# Patient Record
Sex: MALE | Race: WHITE | NOT HISPANIC OR LATINO | Employment: OTHER | URBAN - METROPOLITAN AREA
[De-identification: names, ages, dates, MRNs, and addresses within clinical notes are randomized per-mention and may not be internally consistent; named-entity substitution may affect disease eponyms.]

---

## 2017-09-20 ENCOUNTER — OFFICE VISIT (OUTPATIENT)
Dept: FAMILY MEDICINE | Facility: CLINIC | Age: 77
End: 2017-09-20
Payer: COMMERCIAL

## 2017-09-20 VITALS
HEART RATE: 84 BPM | BODY MASS INDEX: 30.24 KG/M2 | TEMPERATURE: 97.6 F | OXYGEN SATURATION: 95 % | HEIGHT: 69 IN | WEIGHT: 204.2 LBS | SYSTOLIC BLOOD PRESSURE: 127 MMHG | DIASTOLIC BLOOD PRESSURE: 81 MMHG

## 2017-09-20 DIAGNOSIS — Z23 NEED FOR PROPHYLACTIC VACCINATION AND INOCULATION AGAINST INFLUENZA: ICD-10-CM

## 2017-09-20 DIAGNOSIS — I10 ESSENTIAL HYPERTENSION: Primary | ICD-10-CM

## 2017-09-20 DIAGNOSIS — I10 BENIGN ESSENTIAL HYPERTENSION: ICD-10-CM

## 2017-09-20 PROCEDURE — G0008 ADMIN INFLUENZA VIRUS VAC: HCPCS | Performed by: INTERNAL MEDICINE

## 2017-09-20 PROCEDURE — 99213 OFFICE O/P EST LOW 20 MIN: CPT | Mod: 25 | Performed by: INTERNAL MEDICINE

## 2017-09-20 PROCEDURE — 90662 IIV NO PRSV INCREASED AG IM: CPT | Performed by: INTERNAL MEDICINE

## 2017-09-20 RX ORDER — LISINOPRIL 10 MG/1
10 TABLET ORAL DAILY
Qty: 90 TABLET | Refills: 3 | Status: SHIPPED | OUTPATIENT
Start: 2017-09-20 | End: 2018-11-05

## 2017-09-20 NOTE — NURSING NOTE
"Chief Complaint   Patient presents with     Recheck Medication       Initial /81 (BP Location: Right arm, Patient Position: Chair, Cuff Size: Adult Regular)  Pulse 84  Temp 97.6  F (36.4  C) (Oral)  Ht 5' 9\" (1.753 m)  Wt 204 lb 3.2 oz (92.6 kg)  SpO2 95%  BMI 30.16 kg/m2 Estimated body mass index is 30.16 kg/(m^2) as calculated from the following:    Height as of this encounter: 5' 9\" (1.753 m).    Weight as of this encounter: 204 lb 3.2 oz (92.6 kg).  Medication Reconciliation: complete.  Jeannine Chao CMA    "

## 2017-09-20 NOTE — PROGRESS NOTES
Injectable Influenza Immunization Documentation    1.  Is the person to be vaccinated sick today?   No    2. Does the person to be vaccinated have an allergy to a component   of the vaccine?   No    3. Has the person to be vaccinated ever had a serious reaction   to influenza vaccine in the past?   No    4. Has the person to be vaccinated ever had Guillain-Barré syndrome?   No    [facility]  Prior to injection verified patient identity using patient's name and date of birth.  Per orders of Dr. Montalvo, injection of Flu HD given by Marilou Gaviria. Patient instructed to remain in clinic for 15 minutes afterwards, and to report any adverse reaction to me immediately.

## 2017-09-20 NOTE — MR AVS SNAPSHOT
"              After Visit Summary   2017    Bimal Lemos    MRN: [id number]           Patient Information     Date Of Birth          1940        Visit Information        Provider Department      2017 4:00 PM Fredo Montalvo MD [facility]        Today's Diagnoses     Essential hypertension    -  1    Benign essential hypertension        Need for prophylactic vaccination and inoculation against influenza           Follow-ups after your visit        Who to contact     If you have questions or need follow up information about today's clinic visit or your schedule please contact [facility] directly at [phone].  Normal or non-critical lab and imaging results will be communicated to you by IntoOutdoorshart, letter or phone within 4 business days after the clinic has received the results. If you do not hear from us within 7 days, please contact the clinic through IntoOutdoorshart or phone. If you have a critical or abnormal lab result, we will notify you by phone as soon as possible.  Submit refill requests through TapTrack or call your pharmacy and they will forward the refill request to us. Please allow 3 business days for your refill to be completed.          Additional Information About Your Visit        MyChart Information     TapTrack lets you send messages to your doctor, view your test results, renew your prescriptions, schedule appointments and more. To sign up, go to [email or web address] . Click on \"Log in\" on the left side of the screen, which will take you to the Welcome page. Then click on \"Sign up Now\" on the right side of the page.     You will be asked to enter the access code listed below, as well as some personal information. Please follow the directions to create your username and password.     Your access code is: [email or web address]  Expires: 2017  6:10 AM     Your access code will  in 90 days. If you need help or a new code, please call your [facility] or " "[phone].        Care EveryWhere ID     This is your Care EveryWhere ID. This could be used by other organizations to access your [place] medical records  [id number]        Your Vitals Were     Pulse Temperature Height Pulse Oximetry BMI (Body Mass Index)       84 97.6  F (36.4  C) (Oral) 5' 9\" (1.753 m) 95% 30.16 kg/m2        Blood Pressure from Last 3 Encounters:   09/20/17 127/81   09/01/16 127/82   10/02/13 116/79    Weight from Last 3 Encounters:   09/20/17 204 lb 3.2 oz (92.6 kg)   09/01/16 216 lb 8 oz (98.2 kg)   10/02/13 210 lb (95.3 kg)              We Performed the Following     ADMIN INFLUENZA (For MEDICARE Patients ONLY) []     FLU VACCINE, INCREASED ANTIGEN, PRESV FREE          Today's Medication Changes          These changes are accurate as of: 9/20/17 11:59 PM.  If you have any questions, ask your nurse or doctor.               These medicines have changed or have updated prescriptions.        Dose/Directions    lisinopril 10 MG tablet   Commonly known as:  PRINIVIL/ZESTRIL   This may have changed:  Another medication with the same name was removed. Continue taking this medication, and follow the directions you see here.   Used for:  Benign essential hypertension   Changed by:  Fredo Montalvo MD        Dose:  10 mg   Take 1 tablet (10 mg) by mouth daily   Quantity:  90 tablet   Refills:  3            Where to get your medicines      These medications were sent to [facility] [address] [place] [id number]     Phone:  [phone]     lisinopril 10 MG tablet                Primary Care Provider Office Phone # Fax #    Fredo Montalvo [phone] [address] [facility]  [facility] [address]        Equal Access to Services     ANGELIA LACEY : Olivier morenoo Sohumphrey, waaxda luqadaha, qaybta kaalmada adeegyamoody, elidia roberto . [facility] [phone].    ATENCIÓN: Si " kortney harkins, tiene a zaragoza disposición servicios gratuitos de asistencia lingüística. Devaughn oneil [phone].    We comply with applicable federal civil rights laws and [place] laws. We do not discriminate on the basis of race, color, national origin, age, disability sex, sexual orientation or gender identity.            Thank you!     Thank you for choosing [facility]  for your care. Our goal is always to provide you with excellent care. Hearing back from our patients is one way we can continue to improve our services. Please take a few minutes to complete the written survey that you may receive in the mail after your visit with us. Thank you!             Your Updated Medication List - Protect others around you: Learn how to safely use, store and throw away your medicines at www.disposemymeds.org.          This list is accurate as of: 9/20/17 11:59 PM.  Always use your most recent med list.                   Brand Name Dispense Instructions for use Diagnosis    lisinopril 10 MG tablet    PRINIVIL/ZESTRIL    90 tablet    Take 1 tablet (10 mg) by mouth daily    Benign essential hypertension       VITAMIN D3 PO      Take 2,000 Units by mouth daily

## 2017-09-20 NOTE — PROGRESS NOTES
"  SUBJECTIVE:   Bimal Lemos is a 77 year old male who presents to clinic today for the following health issues:    Patient notes wife has Parkinson's and now requires long term care. He states he is trying to take time for himself though he has not been exercising.     Patient has a history of hypertension and reports symptoms have seemingly been under control with current medication. Lisinopril is well tolerated.     Problem list and histories reviewed & adjusted, as indicated.  Additional history: as documented    Current Outpatient Prescriptions   Medication Sig Dispense Refill     lisinopril (PRINIVIL,ZESTRIL) 10 MG tablet Take 1 tablet (10 mg) by mouth daily 90 tablet 3     Cholecalciferol (VITAMIN D3 PO) Take 2,000 Units by mouth daily        [DISCONTINUED] LISINOPRIL PO Take 10 mg by mouth daily        No Known Allergies    Reviewed and updated as needed this visit by clinical staff  Tobacco  Allergies  Meds  Soc Hx      Reviewed and updated as needed this visit by Provider         ROS:  Constitutional, HEENT, cardiovascular, pulmonary, gi and gu systems are negative, except as otherwise noted.    This document serves as a record of the services and decisions personally performed and made by Fredo Montalvo MD. It was created on his/her behalf by Amber Robledo, a trained medical scribe. The creation of this document is based the provider's statements to the medical scribe.  Scribjustine Robledo 4:54 PM, September 20, 2017     OBJECTIVE:   /81 (BP Location: Right arm, Patient Position: Chair, Cuff Size: Adult Regular)  Pulse 84  Temp 97.6  F (36.4  C) (Oral)  Ht 1.753 m (5' 9\")  Wt 92.6 kg (204 lb 3.2 oz)  SpO2 95%  BMI 30.16 kg/m2  Body mass index is 30.16 kg/(m^2).  Neck was supple without adenopathy or thyromegaly his carotids were normal without bruits  Chest clear to auscultation and percussion  Cardiovascular S1 and S2 are physiologic without gallops. Grade 2/6 late systolic " murmur.  Abdomen bowel sounds were normal.  There is no palpable mass or organomegaly  Extremities nontender without any edema  Pulses pedal pulses are as described otherwise his pulses are bilaterally symmetrical throughout without bruits  Skin without significant abnormality     ASSESSMENT/PLAN:   1. Essential hypertension  - FLU VACCINE, INCREASED ANTIGEN, PRESV FREE    2. Benign essential hypertension  Well controlled with current therapies.  - lisinopril (PRINIVIL/ZESTRIL) 10 MG tablet; Take 1 tablet (10 mg) by mouth daily  Dispense: 90 tablet; Refill: 3    Advised taking time for self-care with wife's now stable, albeit terminal, condition.    Follow up for routine annual physical at his discretion.    Fredo Montalvo MD  [facility]   The information in this document, created by the medical scribe for me, accurately reflects the services I personally performed and the decisions made by me. I have reviewed and approved this document for accuracy prior to leaving the patient care area.  Fredo Montalvo MD  4:54 PM, 09/20/17

## 2017-11-29 ENCOUNTER — OFFICE VISIT (OUTPATIENT)
Dept: FAMILY MEDICINE | Facility: CLINIC | Age: 77
End: 2017-11-29
Payer: COMMERCIAL

## 2017-11-29 VITALS
DIASTOLIC BLOOD PRESSURE: 80 MMHG | HEIGHT: 69 IN | HEART RATE: 103 BPM | SYSTOLIC BLOOD PRESSURE: 136 MMHG | BODY MASS INDEX: 30.36 KG/M2 | WEIGHT: 205 LBS | OXYGEN SATURATION: 93 % | TEMPERATURE: 97 F

## 2017-11-29 DIAGNOSIS — Z12.5 SCREENING FOR PROSTATE CANCER: ICD-10-CM

## 2017-11-29 DIAGNOSIS — Z00.00 ENCOUNTER FOR ROUTINE ADULT HEALTH EXAMINATION WITHOUT ABNORMAL FINDINGS: ICD-10-CM

## 2017-11-29 DIAGNOSIS — Z12.11 SPECIAL SCREENING FOR MALIGNANT NEOPLASMS, COLON: Primary | ICD-10-CM

## 2017-11-29 DIAGNOSIS — E55.9 VITAMIN D DEFICIENCY: ICD-10-CM

## 2017-11-29 LAB
ALBUMIN UR-MCNC: 100 MG/DL
APPEARANCE UR: CLEAR
BILIRUB UR QL STRIP: NEGATIVE
COLOR UR AUTO: YELLOW
ERYTHROCYTE [DISTWIDTH] IN BLOOD BY AUTOMATED COUNT: 13.7 % (ref 10–15)
GLUCOSE UR STRIP-MCNC: NEGATIVE MG/DL
HCT VFR BLD AUTO: 48.5 % (ref 40–53)
HGB BLD-MCNC: 16.5 G/DL (ref 13.3–17.7)
HGB UR QL STRIP: NEGATIVE
KETONES UR STRIP-MCNC: NEGATIVE MG/DL
LEUKOCYTE ESTERASE UR QL STRIP: NEGATIVE
MCH RBC QN AUTO: 29.8 PG (ref 26.5–33)
MCHC RBC AUTO-ENTMCNC: 34 G/DL (ref 31.5–36.5)
MCV RBC AUTO: 88 FL (ref 78–100)
MUCOUS THREADS #/AREA URNS LPF: PRESENT /LPF
NITRATE UR QL: NEGATIVE
PH UR STRIP: 7 PH (ref 5–7)
PLATELET # BLD AUTO: 226 10E9/L (ref 150–450)
RBC # BLD AUTO: 5.54 10E12/L (ref 4.4–5.9)
RBC #/AREA URNS AUTO: ABNORMAL /HPF
SOURCE: ABNORMAL
SP GR UR STRIP: 1.02 (ref 1–1.03)
UROBILINOGEN UR STRIP-ACNC: 0.2 EU/DL (ref 0.2–1)
WBC # BLD AUTO: 7 10E9/L (ref 4–11)
WBC #/AREA URNS AUTO: ABNORMAL /HPF

## 2017-11-29 PROCEDURE — 36415 COLL VENOUS BLD VENIPUNCTURE: CPT | Performed by: INTERNAL MEDICINE

## 2017-11-29 PROCEDURE — 80053 COMPREHEN METABOLIC PANEL: CPT | Performed by: INTERNAL MEDICINE

## 2017-11-29 PROCEDURE — G0103 PSA SCREENING: HCPCS | Performed by: INTERNAL MEDICINE

## 2017-11-29 PROCEDURE — 80061 LIPID PANEL: CPT | Performed by: INTERNAL MEDICINE

## 2017-11-29 PROCEDURE — 99397 PER PM REEVAL EST PAT 65+ YR: CPT | Performed by: INTERNAL MEDICINE

## 2017-11-29 PROCEDURE — 81001 URINALYSIS AUTO W/SCOPE: CPT | Performed by: INTERNAL MEDICINE

## 2017-11-29 PROCEDURE — 82306 VITAMIN D 25 HYDROXY: CPT | Performed by: INTERNAL MEDICINE

## 2017-11-29 PROCEDURE — 85027 COMPLETE CBC AUTOMATED: CPT | Performed by: INTERNAL MEDICINE

## 2017-11-29 NOTE — PROGRESS NOTES
SUBJECTIVE:   Bimal Lemos is a 77 year old male who presents for Preventive Visit.    Mr. Lemos states he lost his wife to Parkinson's on the 12th of November.    Are you in the first 12 months of your Medicare Part B coverage?  No    Healthy Habits:    Do you get at least three servings of calcium containing foods daily (dairy, green leafy vegetables, etc.)? no, taking calcium and/or vitamin D supplement: yes -     Amount of exercise or daily activities, outside of work: none    Problems taking medications regularly No    Medication side effects: No    Have you had an eye exam in the past two years? yes    Do you see a dentist twice per year? yes    Do you have sleep apnea, excessive snoring or daytime drowsiness?no    COGNITIVE SCREEN  1) Repeat 3 items (Banana, Sunrise, Chair)    2) Clock draw: NORMAL  3) 3 item recall: Recalls 2 objects   Results: NORMAL clock, 1-2 items recalled: COGNITIVE IMPAIRMENT LESS LIKELY    Mini-CogTM Copyright CHINO Bass. Licensed by the author for use in [facility]; reprinted with permission (bridget@[facility]). All rights reserved.      Reviewed and updated as needed this visit by clinical staffTobacco  Allergies  Meds  Problems  Med Hx  Surg Hx  Fam Hx  Soc Hx        Reviewed and updated as needed this visit by Provider        Social History   Substance Use Topics     Smoking status: Never Smoker     Smokeless tobacco: Never Used     Alcohol use 0.0 oz/week     0 Standard drinks or equivalent per week      Comment: rare     The patient does not drink >3 drinks per day nor >7 drinks per week.     Today's PHQ-2 Score:   PHQ-2 ( 1999 Pf[employer]) 11/29/2017 9/1/2016   Q1: Little interest or pleasure in doing things 0 0   Q2: Feeling down, depressed or hopeless 0 0   PHQ-2 Score 0 0     Do you feel safe in your environment - Yes    Do you have a Health Care Directive?: No: Advance care planning reviewed with patient; information given to patient to review.    Current  providers sharing in care for this patient include: Patient Care Team:  Fredo Montalvo MD as PCP - General (Internal Medicine)      Hearing impairment: No    Ability to successfully perform activities of daily living: Yes, no assistance needed     Fall risk:  Fallen 2 or more times in the past year?: No  Any fall with injury in the past year?: No    Home safety:  none identified  Eli Rowe MA      The following health maintenance items are reviewed in Epic and correct as of today:  Health Maintenance   Topic Date Due     TETANUS IMMUNIZATION (SYSTEM ASSIGNED)  06/02/1959     ADVANCE DIRECTIVE PLANNING Q5 YRS  06/02/1996     FALL RISK ASSESSMENT  06/02/2006     PNEUMOCOCCAL (2 of 2 - PPSV23) 09/01/2016     LIPID SCREEN Q5 YR MALE (SYSTEM ASSIGNED)  09/22/2020     INFLUENZA VACCINE (SYSTEM ASSIGNED)  Completed     Current Outpatient Prescriptions   Medication Sig Dispense Refill     lisinopril (PRINIVIL/ZESTRIL) 10 MG tablet Take 1 tablet (10 mg) by mouth daily 90 tablet 3     Cholecalciferol (VITAMIN D3 PO) Take 2,000 Units by mouth daily        No Known Allergies    ROS:  C: NEGATIVE for fever, chills, change in weight  I: POSITIVE for known lipoma on his right cheek. NEGATIVE for worrisome rashes, moles or lesions  E: NEGATIVE for vision changes or irritation  E/M: NEGATIVE for ear, mouth and throat problems  R: NEGATIVE for significant cough or SOB  B: NEGATIVE for masses, tenderness or discharge  CV: NEGATIVE for chest pain, palpitations or peripheral edema  GI: NEGATIVE for nausea, abdominal pain, heartburn, or change in bowel habits  : POSITIVE for occasional nocturia dependent on late drinks. NEGATIVE for frequency, dysuria, or hematuria  M: NEGATIVE for significant arthralgias or myalgia  N: NEGATIVE for weakness, dizziness or paresthesias  E: NEGATIVE for temperature intolerance, skin/hair changes  H: NEGATIVE for bleeding problems  P: NEGATIVE for changes in mood or affect    This document serves  "as a record of the services and decisions personally performed and made by Fredo Montalvo MD. It was created on his/her behalf by Amber Robledo, a trained medical scribe. The creation of this document is based the provider's statements to the medical scribe.  Scribjustine Robledo 10:44 AM, November 29, 2017     OBJECTIVE:   /80 (BP Location: Right arm, Cuff Size: Adult Large)  Pulse 103  Temp 97  F (36.1  C) (Oral)  Ht 1.753 m (5' 9\")  Wt 93 kg (205 lb)  SpO2 93%  BMI 30.27 kg/m2 Estimated body mass index is 30.27 kg/(m^2) as calculated from the following:    Height as of this encounter: 1.753 m (5' 9\").    Weight as of this encounter: 93 kg (205 lb).  EXAM:   GENERAL: healthy, alert and no distress  EYES: Eyes grossly normal to inspection, PERRL and conjunctivae and sclerae normal  HENT: ear canals and TM's normal, nose and mouth without ulcers or lesions  NECK: no adenopathy, no asymmetry, masses, or scars and thyroid normal to palpation  RESP: lungs clear to auscultation - no rales, rhonchi or wheezes  CV: regular rate and rhythm, normal S1 S2, no S3 or S4, no murmur, click or rub, no peripheral edema and peripheral pulses strong  ABDOMEN: soft, nontender, no hepatosplenomegaly, no masses and bowel sounds normal  : Testicles descended and normal, no herniorrhaphy, anus normal, prostate 2+ and smooth   MS: no gross musculoskeletal defects noted, no edema  SKIN: no suspicious lesions or rashes. xanthelasma of his right superior cheek. Sheet of seborrheic keratoses.  NEURO: Normal strength and tone, mentation intact and speech normal  PSYCH: mentation appears normal, affect normal/bright    ASSESSMENT / PLAN:   1. Special screening for malignant neoplasms, colon  Due to age, patient will not undergo colonoscopy. Recommended FIT test which will be completed at home and returned to clinic.   - Fecal colorectal cancer screen (FIT); Future    2. Encounter for routine adult health examination " "without abnormal findings  Advised to begin activity 30-40 minutes 3-4 times a week.  - UA reflex to Microscopic and Culture  - CBC with platelets  - Comprehensive metabolic panel  - Lipid panel reflex to direct LDL Fasting    3. Screening for prostate cancer  - Prostate spec antigen screen    4. Vitamin D deficiency  - Vitamin D Deficiency    Follow up will be determined with return of lab results. With grief from loss of wife and the impending holiday season patient was instructed to call if referral or medication are needed.    End of Life Planning:  Patient currently has an advanced directive: No.  I have verified the patient's ablity to prepare an advanced directive/make health care decisions.  Literature was provided to assist patient in preparing an advanced directive.    COUNSELING:  Reviewed preventive health counseling, as reflected in patient instructions       Regular exercise       Healthy diet/nutrition    Estimated body mass index is 30.27 kg/(m^2) as calculated from the following:    Height as of this encounter: 1.753 m (5' 9\").    Weight as of this encounter: 93 kg (205 lb).  Weight management plan: Discussed healthy diet and exercise guidelines and patient will follow up in 12 months in clinic to re-evaluate.   reports that he has never smoked. He has never used smokeless tobacco.    Appropriate preventive services were discussed with this patient, including applicable screening as appropriate for cardiovascular disease, diabetes, osteopenia/osteoporosis, and glaucoma.  As appropriate for age/gender, discussed screening for colorectal cancer, prostate cancer, breast cancer, and cervical cancer. Checklist reviewing preventive services available has been given to the patient.    Reviewed patients plan of care and provided an AVS. The Basic Care Plan (routine screening as documented in Health Maintenance) for Bimal meets the Care Plan requirement. This Care Plan has been established and reviewed " with the Patient.    Counseling Resources:  ATP IV Guidelines  Pooled Cohorts Equation Calculator  Breast Cancer Risk Calculator  FRAX Risk Assessment  ICSI Preventive Guidelines  Dietary Guidelines for Americans, 2010  USDA's MyPlate  ASA Prophylaxis  Lung CA Screening    Fredo Montalvo MD  [facility]    The information in this document, created by the medical scribe for me, accurately reflects the services I personally performed and the decisions made by me. I have reviewed and approved this document for accuracy prior to leaving the patient care area.  Fredo Montalvo MD  10:44 AM, 11/29/17

## 2017-11-29 NOTE — MR AVS SNAPSHOT
After Visit Summary   11/29/2017    Bimal Lemos    MRN: [id number]           Patient Information     Date Of Birth          1940        Visit Information        Provider Department      11/29/2017 10:30 AM Fredo Montalvo MD [facility]        Today's Diagnoses     Special screening for malignant neoplasms, colon    -  1    Encounter for routine adult health examination without abnormal findings        Screening for prostate cancer        Vitamin D deficiency          Care Instructions      Preventive Health Recommendations:       Male Ages 65 and over    Yearly exam:             See your health care provider every year in order to  o   Review health changes.   o   Discuss preventive care.    o   Review your medicines if your doctor has prescribed any.    Talk with your health care provider about whether you should have a test to screen for prostate cancer (PSA).    Every 3 years, have a diabetes test (fasting glucose). If you are at risk for diabetes, you should have this test more often.    Every 5 years, have a cholesterol test. Have this test more often if you are at risk for high cholesterol or heart disease.     Every 10 years, have a colonoscopy. Or, have a yearly FIT test (stool test). These exams will check for colon cancer.    Talk to with your health care provider about screening for Abdominal Aortic Aneurysm if you have a family history of AAA or have a history of smoking.  Shots:     Get a flu shot each year.     Get a tetanus shot every 10 years.     Talk to your doctor about your pneumonia vaccines. There are now two you should receive - Pneumovax (PPSV 23) and Prevnar (PCV 13).    Talk to your doctor about a shingles vaccine.     Talk to your doctor about the hepatitis B vaccine.  Nutrition:     Eat at least 5 servings of fruits and vegetables each day.     Eat whole-grain bread, whole-wheat pasta and brown rice instead of white grains and rice.     Talk to your  "doctor about Calcium and Vitamin D.   Lifestyle    Exercise for at least 150 minutes a week (30 minutes a day, 5 days a week). This will help you control your weight and prevent disease.     Limit alcohol to one drink per day.     No smoking.     Wear sunscreen to prevent skin cancer.     See your dentist every six months for an exam and cleaning.     See your eye doctor every 1 to 2 years to screen for conditions such as glaucoma, macular degeneration and cataracts.          Follow-ups after your visit        Who to contact     If you have questions or need follow up information about today's clinic visit or your schedule please contact [facility] directly at [phone].  Normal or non-critical lab and imaging results will be communicated to you by MyChart, letter or phone within 4 business days after the clinic has received the results. If you do not hear from us within 7 days, please contact the clinic through [employer] or phone. If you have a critical or abnormal lab result, we will notify you by phone as soon as possible.  Submit refill requests through [employer] or call your pharmacy and they will forward the refill request to us. Please allow 3 business days for your refill to be completed.          Additional Information About Your Visit        [employer] Information     [employer] lets you send messages to your doctor, view your test results, renew your prescriptions, schedule appointments and more. To sign up, go to [email or web address] . Click on \"Log in\" on the left side of the screen, which will take you to the Welcome page. Then click on \"Sign up Now\" on the right side of the page.     You will be asked to enter the access code listed below, as well as some personal information. Please follow the directions to create your username and password.     Your access code is: [email or web address]  Expires: 2017  5:10 AM     Your access code will  in 90 days. If you need help or a new code, please " "call your [facility] or [phone].        Care EveryWhere ID     This is your Care EveryWhere ID. This could be used by other organizations to access your [facility] medical records  [id number]        Your Vitals Were     Pulse Temperature Height Pulse Oximetry BMI (Body Mass Index)       103 97  F (36.1  C) (Oral) 5' 9\" (1.753 m) 93% 30.27 kg/m2        Blood Pressure from Last 3 Encounters:   11/29/17 136/80   09/20/17 127/81   09/01/16 127/82    Weight from Last 3 Encounters:   11/29/17 205 lb (93 kg)   09/20/17 204 lb 3.2 oz (92.6 kg)   09/01/16 216 lb 8 oz (98.2 kg)              We Performed the Following     CBC with platelets     Comprehensive metabolic panel     Lipid panel reflex to direct LDL Fasting     Prostate spec antigen screen     UA reflex to Microscopic and Culture     Urine Microscopic     Vitamin D Deficiency        Primary Care Provider Office Phone # Fax #    Fredo Montalvo [phone] [address] [address]        Equal Access to Services     [facility]: Hadii aad ku hadasho Soomaali, waaxda luqadaha, qaybta kaalmada adejosieyamoody, elidia roberto . So [facility] [phone].    ATENCIÓN: Si habla español, tiene a zaragoza disposición servicios gratuitos de asistencia lingüística. [facility] [phone].    We comply with applicable federal civil rights laws and [place] laws. We do not discriminate on the basis of race, color, national origin, age, disability, sex, sexual orientation, or gender identity.            Thank you!     Thank you for choosing [facility]  for your care. Our goal is always to provide you with excellent care. Hearing back from our patients is one way we can continue to improve our services. Please take a few minutes to complete the written survey that you may receive in the mail after your visit with us. Thank you!             Your Updated Medication List - Protect others around you: Learn " how to safely use, store and throw away your medicines at www.disposemymeds.org.          This list is accurate as of: 11/29/17 11:59 PM.  Always use your most recent med list.                   Brand Name Dispense Instructions for use Diagnosis    lisinopril 10 MG tablet    PRINIVIL/ZESTRIL    90 tablet    Take 1 tablet (10 mg) by mouth daily    Benign essential hypertension       VITAMIN D3 PO      Take 2,000 Units by mouth daily

## 2017-11-29 NOTE — LETTER
[facility]  [address] [facility]  [address]  [place]  [address]  Tel: [phone]    January 8, 2018    Bimal Lemos  [address]  [place] [address]        Nic,    Ramona you will see your lab reports from your recent physical. There are a few laboratory tests that need some follow-up.    Your PSA was essentially of the upper limits of normal. On exam I didn't feel any nodules or any significant abnormalities. I recommend rechecking this in six months.    The vitamin D level which is important for bone health was at the lower limits of normal. Taking a vitamin D3 supplement at 400 international units daily would be a good idea.    Comprehensive metabolic panel look good. Your minerals and electrolytes were all normal, the kidney function tests were normal as were all of your liver function tests.    Your lipid panel in general was good however it looks like your triglycerides are higher than a year ago. The triglycerides were 169, anything less than 150 is completely normal, it would be worrisome it was over 300. The triglycerides are intimately related to carbohydrates in your diet. The HDL or good cholesterol is 59, better than 49 from a year ago and anything greater than 40 is normal.The LDL or bad cholesterol is staying stable, it was 110, similar to 106 from a year ago. The LDL is normal at less than 130.    The urinalysis was normal. Your blood cell counts were normal as well, no sign of low blood or anemia.    Last but not least you had done a A colon cancer screening test which was abnormal. This does not mean that you have colon cancer but it does need to be followed up with a colonoscopy. I've taken the liberty to sign you up for a colonoscopy and you should be hearing from the gastroenterologist to schedule this at your convenience.    If you have any questions regarding these reports please let me know. If you don't hear from the gastroenterologist please contact my office.  Otherwise, I'll anticipate seeing it back in the office in approximately six months.    If you have any further questions or problems, please contact our office.      Sincerely,    Fredo Motnalvo MD / brian          Resulted Orders   UA reflex to Microscopic and Culture   Result Value Ref Range    Color Urine Yellow     Appearance Urine Clear     Glucose Urine Negative NEG^Negative mg/dL    Bilirubin Urine Negative NEG^Negative    Ketones Urine Negative NEG^Negative mg/dL    Specific Gravity Urine 1.020 1.003 - 1.035    Blood Urine Negative NEG^Negative    pH Urine 7.0 5.0 - 7.0 pH    Protein Albumin Urine 100 (A) NEG^Negative mg/dL    Urobilinogen Urine 0.2 0.2 - 1.0 EU/dL    Nitrite Urine Negative NEG^Negative    Leukocyte Esterase Urine Negative NEG^Negative    Source Midstream Urine    CBC with platelets   Result Value Ref Range    WBC 7.0 4.0 - 11.0 10e9/L    RBC Count 5.54 4.4 - 5.9 10e12/L    Hemoglobin 16.5 13.3 - 17.7 g/dL    Hematocrit 48.5 40.0 - 53.0 %    MCV 88 78 - 100 fl    MCH 29.8 26.5 - 33.0 pg    MCHC 34.0 31.5 - 36.5 g/dL    RDW 13.7 10.0 - 15.0 %    Platelet Count 226 150 - 450 10e9/L   Comprehensive metabolic panel   Result Value Ref Range    Sodium 141 133 - 144 mmol/L    Potassium 4.2 3.4 - 5.3 mmol/L    Chloride 106 94 - 109 mmol/L    Carbon Dioxide 27 20 - 32 mmol/L    Anion Gap 8 3 - 14 mmol/L    Glucose 79 70 - 99 mg/dL    Urea Nitrogen 16 7 - 30 mg/dL    Creatinine 1.00 0.66 - 1.25 mg/dL    GFR Estimate 73 >60 mL/min/1.7m2      Comment:      Non  GFR Calc    GFR Estimate If Black 88 >60 mL/min/1.7m2      Comment:       GFR Calc    Calcium 9.2 8.5 - 10.1 mg/dL    Bilirubin Total 1.0 0.2 - 1.3 mg/dL    Albumin 3.7 3.4 - 5.0 g/dL    Protein Total 7.0 6.8 - 8.8 g/dL    Alkaline Phosphatase 83 40 - 150 U/L    ALT 18 0 - 70 U/L    AST 18 0 - 45 U/L   Lipid panel reflex to direct LDL Fasting   Result Value Ref Range    Cholesterol 203 (H) <200 mg/dL      Comment:       Desirable:       <200 mg/dl    Triglycerides 169 (H) <150 mg/dL      Comment:      Borderline high:  150-199 mg/dl  High:             200-499 mg/dl  Very high:       >499 mg/dl      HDL Cholesterol 59 >39 mg/dL    LDL Cholesterol Calculated 110 (H) <100 mg/dL      Comment:      Above desirable:  100-129 mg/dl  Borderline High:  130-159 mg/dL  High:             160-189 mg/dL  Very high:       >189 mg/dl      Non HDL Cholesterol 144 (H) <130 mg/dL      Comment:      Above Desirable:  130-159 mg/dl  Borderline high:  160-189 mg/dl  High:             190-219 mg/dl  Very high:       >219 mg/dl     Prostate spec antigen screen   Result Value Ref Range    PSA 4.43 (H) 0 - 4 ug/L      Comment:      Assay Method:  Chemiluminescence using Siemens Vista analyzer   Vitamin D Deficiency   Result Value Ref Range    Vitamin D Deficiency screening 30 20 - 75 ug/L      Comment:      Season, race, dietary intake, and treatment affect the concentration of   25-hydroxy-Vitamin D. Values may decrease during winter months and increase   during summer months. Values 20-29 ug/L may indicate Vitamin D insufficiency   and values <20 ug/L may indicate Vitamin D deficiency.  Vitamin D determination is routinely performed by an immunoassay specific for   25 hydroxyvitamin D3.  If an individual is on vitamin D2 (ergocalciferol)   supplementation, please specify 25 OH vitamin D2 and D3 level determination by   LCMSMS test [id number].     Urine Microscopic   Result Value Ref Range    WBC Urine O - 2 OTO2^O - 2 /HPF    RBC Urine O - 2 OTO2^O - 2 /HPF    Mucous Urine Present (A) NEG^Negative /LPF

## 2017-11-29 NOTE — NURSING NOTE
"Chief Complaint   Patient presents with     Wellness Visit       Initial /80 (BP Location: Right arm, Cuff Size: Adult Large)  Pulse 103  Temp 97  F (36.1  C) (Oral)  Ht 5' 9\" (1.753 m)  Wt 205 lb (93 kg)  SpO2 93%  BMI 30.27 kg/m2 Estimated body mass index is 30.27 kg/(m^2) as calculated from the following:    Height as of this encounter: 5' 9\" (1.753 m).    Weight as of this encounter: 205 lb (93 kg).  Medication Reconciliation: complete     Eli Rowe MA    "

## 2017-11-30 LAB
ALBUMIN SERPL-MCNC: 3.7 G/DL (ref 3.4–5)
ALP SERPL-CCNC: 83 U/L (ref 40–150)
ALT SERPL W P-5'-P-CCNC: 18 U/L (ref 0–70)
ANION GAP SERPL CALCULATED.3IONS-SCNC: 8 MMOL/L (ref 3–14)
AST SERPL W P-5'-P-CCNC: 18 U/L (ref 0–45)
BILIRUB SERPL-MCNC: 1 MG/DL (ref 0.2–1.3)
BUN SERPL-MCNC: 16 MG/DL (ref 7–30)
CALCIUM SERPL-MCNC: 9.2 MG/DL (ref 8.5–10.1)
CHLORIDE SERPL-SCNC: 106 MMOL/L (ref 94–109)
CHOLEST SERPL-MCNC: 203 MG/DL
CO2 SERPL-SCNC: 27 MMOL/L (ref 20–32)
CREAT SERPL-MCNC: 1 MG/DL (ref 0.66–1.25)
DEPRECATED CALCIDIOL+CALCIFEROL SERPL-MC: 30 UG/L (ref 20–75)
GFR SERPL CREATININE-BSD FRML MDRD: 73 ML/MIN/1.7M2
GLUCOSE SERPL-MCNC: 79 MG/DL (ref 70–99)
HDLC SERPL-MCNC: 59 MG/DL
LDLC SERPL CALC-MCNC: 110 MG/DL
NONHDLC SERPL-MCNC: 144 MG/DL
POTASSIUM SERPL-SCNC: 4.2 MMOL/L (ref 3.4–5.3)
PROT SERPL-MCNC: 7 G/DL (ref 6.8–8.8)
PSA SERPL-ACNC: 4.43 UG/L (ref 0–4)
SODIUM SERPL-SCNC: 141 MMOL/L (ref 133–144)
TRIGL SERPL-MCNC: 169 MG/DL

## 2017-12-03 ENCOUNTER — HOSPITAL ENCOUNTER (OUTPATIENT)
Facility: CLINIC | Age: 77
Setting detail: SPECIMEN
End: 2017-12-03
Payer: MEDICARE

## 2017-12-03 PROCEDURE — G0328 FECAL BLOOD SCRN IMMUNOASSAY: HCPCS | Performed by: INTERNAL MEDICINE

## 2017-12-04 DIAGNOSIS — Z12.11 SPECIAL SCREENING FOR MALIGNANT NEOPLASMS, COLON: ICD-10-CM

## 2017-12-05 LAB — HEMOCCULT STL QL IA: POSITIVE

## 2018-03-01 ENCOUNTER — TRANSFERRED RECORDS (OUTPATIENT)
Dept: HEALTH INFORMATION MANAGEMENT | Facility: CLINIC | Age: 78
End: 2018-03-01

## 2018-03-01 ENCOUNTER — HOSPITAL ENCOUNTER (OUTPATIENT)
Facility: CLINIC | Age: 78
Discharge: HOME | End: 2018-03-01
Attending: SPECIALIST | Admitting: SPECIALIST
Payer: MEDICARE

## 2018-03-01 ENCOUNTER — SURGERY (OUTPATIENT)
Age: 78
End: 2018-03-01

## 2018-03-01 VITALS
OXYGEN SATURATION: 96 % | BODY MASS INDEX: 28.63 KG/M2 | SYSTOLIC BLOOD PRESSURE: 124 MMHG | DIASTOLIC BLOOD PRESSURE: 83 MMHG | WEIGHT: 200 LBS | RESPIRATION RATE: 15 BRPM | HEIGHT: 70 IN

## 2018-03-01 LAB — COLONOSCOPY: NORMAL

## 2018-03-01 PROCEDURE — 88305 TISSUE EXAM BY PATHOLOGIST: CPT | Performed by: SPECIALIST

## 2018-03-01 PROCEDURE — 45385 COLONOSCOPY W/LESION REMOVAL: CPT | Performed by: SPECIALIST

## 2018-03-01 PROCEDURE — 88305 TISSUE EXAM BY PATHOLOGIST: CPT | Mod: 26 | Performed by: SPECIALIST

## 2018-03-01 PROCEDURE — G0500 MOD SEDAT ENDO SERVICE >5YRS: HCPCS | Performed by: SPECIALIST

## 2018-03-01 PROCEDURE — 25000128 H RX IP 250 OP 636: Performed by: SPECIALIST

## 2018-03-01 RX ORDER — LIDOCAINE 40 MG/G
CREAM TOPICAL
Status: DISCONTINUED | OUTPATIENT
Start: 2018-03-01 | End: 2018-03-01 | Stop reason: HOSPADM

## 2018-03-01 RX ORDER — FENTANYL CITRATE 50 UG/ML
INJECTION, SOLUTION INTRAMUSCULAR; INTRAVENOUS PRN
Status: DISCONTINUED | OUTPATIENT
Start: 2018-03-01 | End: 2018-03-01 | Stop reason: HOSPADM

## 2018-03-01 RX ORDER — ONDANSETRON 2 MG/ML
4 INJECTION INTRAMUSCULAR; INTRAVENOUS
Status: DISCONTINUED | OUTPATIENT
Start: 2018-03-01 | End: 2018-03-01 | Stop reason: HOSPADM

## 2018-03-01 RX ADMIN — MIDAZOLAM 1 MG: 1 INJECTION INTRAMUSCULAR; INTRAVENOUS at 12:57

## 2018-03-01 RX ADMIN — MIDAZOLAM 0.5 MG: 1 INJECTION INTRAMUSCULAR; INTRAVENOUS at 13:02

## 2018-03-01 RX ADMIN — FENTANYL CITRATE 25 MCG: 50 INJECTION, SOLUTION INTRAMUSCULAR; INTRAVENOUS at 13:02

## 2018-03-01 RX ADMIN — FENTANYL CITRATE 50 MCG: 50 INJECTION, SOLUTION INTRAMUSCULAR; INTRAVENOUS at 12:57

## 2018-03-01 NOTE — BRIEF OP NOTE
[facility] Brief Operative Note    Pre-operative diagnosis: screening   Post-operative diagnosis Diverticulosis, moderately severe diverticulosis     Procedure: Procedure(s):  colonoscopy - Wound Class: II-Clean Contaminated   Surgeon(s): Surgeon(s) and Role:     * Cesar Morales MD - Primary   Estimated blood loss: * No values recorded between 3/1/2018 12:00 AM and 3/1/2018  1:27 PM *    Specimens:   ID Type Source Tests Collected by Time Destination   A : sigmoid colon polyp Polyp Large Intestine, Sigmoid SURGICAL PATHOLOGY EXAM Cesar Morales MD 3/1/2018  1:18 PM       Findings: Please see ProVation procedure note in Chart Review

## 2018-03-01 NOTE — H&P
"Pre-Endoscopy History and Physical     Bimal Lemos MRN# [id number]   YOB: 1940 Age: 77 year old     Date of Procedure: 3/1/2018  Primary care provider: Fredo Montalvo  Type of Endoscopy: Colonoscopy with possible biopsy, possible polypectomy  Reason for Procedure: screening  Type of Anesthesia Anticipated: Conscious Sedation    HPI:    Bimal is a 77 year old male who will be undergoing the above procedure.      A history and physical has been performed. The patient's medications and allergies have been reviewed. The risks and benefits of the procedure and the sedation options and risks were discussed with the patient.  All questions were answered and informed consent was obtained.      He denies a personal or family history of anesthesia complications or bleeding disorders.     Patient Active Problem List   Diagnosis     Hypertension     Sebaceous cyst     Skin tag        Past Medical History:   Diagnosis Date     Hypertension         Past Surgical History:   Procedure Laterality Date     CATARACT IOL, RT/LT         Social History   Substance Use Topics     Smoking status: Never Smoker     Smokeless tobacco: Never Used     Alcohol use 0.0 oz/week     0 Standard drinks or equivalent per week      Comment: rare       History reviewed. No pertinent family history.    Prior to Admission medications    Medication Sig Start Date End Date Taking? Authorizing Provider   lisinopril (PRINIVIL/ZESTRIL) 10 MG tablet Take 1 tablet (10 mg) by mouth daily 9/20/17  Yes Fredo Montalvo MD   Cholecalciferol (VITAMIN D3 PO) Take 2,000 Units by mouth daily    Yes Reported, Patient       No Known Allergies     REVIEW OF SYSTEMS:   5 point ROS negative except as noted above in HPI, including Gen., Resp., CV, GI &  system review.    PHYSICAL EXAM:   BP (!) 143/95  Resp 9  Ht 1.778 m (5' 10\")  Wt 90.7 kg (200 lb)  SpO2 96%  BMI 28.7 kg/m2 Estimated body mass index is 28.7 kg/(m^2) as calculated from the " "following:    Height as of this encounter: 1.778 m (5' 10\").    Weight as of this encounter: 90.7 kg (200 lb).   GENERAL APPEARANCE: alert, and oriented  MENTAL STATUS: alert  AIRWAY EXAM: Mallampatti Class II (visualization of the soft palate, fauces, and uvula)  RESP: lungs clear to auscultation - no rales, rhonchi or wheezes  CV: regular rates and rhythm  DIAGNOSTICS:    Not indicated    IMPRESSION   ASA Class 2 - Mild systemic disease    PLAN:   Plan for Colonoscopy with possible biopsy, possible polypectomy. We discussed the risks, benefits and alternatives and the patient wished to proceed.    The above has been forwarded to the consulting provider.      Signed Electronically by: Cesar Morales  March 1, 2018          "

## 2018-03-02 LAB — COPATH REPORT: NORMAL

## 2018-11-05 ENCOUNTER — OFFICE VISIT (OUTPATIENT)
Dept: FAMILY MEDICINE | Facility: CLINIC | Age: 78
End: 2018-11-05
Payer: COMMERCIAL

## 2018-11-05 VITALS
HEIGHT: 70 IN | DIASTOLIC BLOOD PRESSURE: 80 MMHG | TEMPERATURE: 98.2 F | BODY MASS INDEX: 30.78 KG/M2 | OXYGEN SATURATION: 96 % | WEIGHT: 215 LBS | HEART RATE: 73 BPM | SYSTOLIC BLOOD PRESSURE: 130 MMHG

## 2018-11-05 DIAGNOSIS — I10 ESSENTIAL HYPERTENSION: ICD-10-CM

## 2018-11-05 DIAGNOSIS — E55.9 VITAMIN D DEFICIENCY: ICD-10-CM

## 2018-11-05 DIAGNOSIS — Z23 NEED FOR PROPHYLACTIC VACCINATION AND INOCULATION AGAINST INFLUENZA: ICD-10-CM

## 2018-11-05 DIAGNOSIS — Z00.00 ENCOUNTER FOR ROUTINE ADULT HEALTH EXAMINATION WITHOUT ABNORMAL FINDINGS: Primary | ICD-10-CM

## 2018-11-05 DIAGNOSIS — Z12.5 SCREENING FOR PROSTATE CANCER: ICD-10-CM

## 2018-11-05 DIAGNOSIS — I10 BENIGN ESSENTIAL HYPERTENSION: ICD-10-CM

## 2018-11-05 DIAGNOSIS — Z13.6 CARDIOVASCULAR SCREENING; LDL GOAL LESS THAN 130: ICD-10-CM

## 2018-11-05 LAB
ALBUMIN SERPL-MCNC: 3.8 G/DL (ref 3.4–5)
ALBUMIN UR-MCNC: ABNORMAL MG/DL
ALP SERPL-CCNC: 78 U/L (ref 40–150)
ALT SERPL W P-5'-P-CCNC: 24 U/L (ref 0–70)
ANION GAP SERPL CALCULATED.3IONS-SCNC: 11 MMOL/L (ref 3–14)
APPEARANCE UR: CLEAR
AST SERPL W P-5'-P-CCNC: 21 U/L (ref 0–45)
BILIRUB SERPL-MCNC: 1.5 MG/DL (ref 0.2–1.3)
BILIRUB UR QL STRIP: NEGATIVE
BUN SERPL-MCNC: 17 MG/DL (ref 7–30)
CALCIUM SERPL-MCNC: 9.2 MG/DL (ref 8.5–10.1)
CHLORIDE SERPL-SCNC: 104 MMOL/L (ref 94–109)
CHOLEST SERPL-MCNC: 168 MG/DL
CO2 SERPL-SCNC: 23 MMOL/L (ref 20–32)
COLOR UR AUTO: YELLOW
CREAT SERPL-MCNC: 1.01 MG/DL (ref 0.66–1.25)
ERYTHROCYTE [DISTWIDTH] IN BLOOD BY AUTOMATED COUNT: 13.6 % (ref 10–15)
GFR SERPL CREATININE-BSD FRML MDRD: 72 ML/MIN/1.7M2
GLUCOSE SERPL-MCNC: 93 MG/DL (ref 70–99)
GLUCOSE UR STRIP-MCNC: NEGATIVE MG/DL
HCT VFR BLD AUTO: 49 % (ref 40–53)
HDLC SERPL-MCNC: 50 MG/DL
HGB BLD-MCNC: 16.5 G/DL (ref 13.3–17.7)
HGB UR QL STRIP: NEGATIVE
KETONES UR STRIP-MCNC: NEGATIVE MG/DL
LDLC SERPL CALC-MCNC: 97 MG/DL
LEUKOCYTE ESTERASE UR QL STRIP: NEGATIVE
MCH RBC QN AUTO: 29.3 PG (ref 26.5–33)
MCHC RBC AUTO-ENTMCNC: 33.7 G/DL (ref 31.5–36.5)
MCV RBC AUTO: 87 FL (ref 78–100)
NITRATE UR QL: NEGATIVE
NONHDLC SERPL-MCNC: 118 MG/DL
PH UR STRIP: 7 PH (ref 5–7)
PLATELET # BLD AUTO: 223 10E9/L (ref 150–450)
POTASSIUM SERPL-SCNC: 3.8 MMOL/L (ref 3.4–5.3)
PROT SERPL-MCNC: 7.4 G/DL (ref 6.8–8.8)
PSA SERPL-ACNC: 7.17 UG/L (ref 0–4)
RBC # BLD AUTO: 5.63 10E12/L (ref 4.4–5.9)
RBC #/AREA URNS AUTO: NORMAL /HPF
SODIUM SERPL-SCNC: 138 MMOL/L (ref 133–144)
SOURCE: ABNORMAL
SP GR UR STRIP: 1.01 (ref 1–1.03)
TRIGL SERPL-MCNC: 107 MG/DL
UROBILINOGEN UR STRIP-ACNC: 2 EU/DL (ref 0.2–1)
WBC # BLD AUTO: 6.5 10E9/L (ref 4–11)
WBC #/AREA URNS AUTO: NORMAL /HPF

## 2018-11-05 PROCEDURE — 99397 PER PM REEVAL EST PAT 65+ YR: CPT | Mod: 25 | Performed by: INTERNAL MEDICINE

## 2018-11-05 PROCEDURE — 80053 COMPREHEN METABOLIC PANEL: CPT | Performed by: INTERNAL MEDICINE

## 2018-11-05 PROCEDURE — 36415 COLL VENOUS BLD VENIPUNCTURE: CPT | Performed by: INTERNAL MEDICINE

## 2018-11-05 PROCEDURE — G0103 PSA SCREENING: HCPCS | Performed by: INTERNAL MEDICINE

## 2018-11-05 PROCEDURE — G0008 ADMIN INFLUENZA VIRUS VAC: HCPCS | Performed by: INTERNAL MEDICINE

## 2018-11-05 PROCEDURE — 80061 LIPID PANEL: CPT | Performed by: INTERNAL MEDICINE

## 2018-11-05 PROCEDURE — 82306 VITAMIN D 25 HYDROXY: CPT | Performed by: INTERNAL MEDICINE

## 2018-11-05 PROCEDURE — 85027 COMPLETE CBC AUTOMATED: CPT | Performed by: INTERNAL MEDICINE

## 2018-11-05 PROCEDURE — 81001 URINALYSIS AUTO W/SCOPE: CPT | Performed by: INTERNAL MEDICINE

## 2018-11-05 PROCEDURE — 90662 IIV NO PRSV INCREASED AG IM: CPT | Performed by: INTERNAL MEDICINE

## 2018-11-05 RX ORDER — LISINOPRIL 10 MG/1
10 TABLET ORAL DAILY
Qty: 90 TABLET | Refills: 3 | Status: SHIPPED | OUTPATIENT
Start: 2018-11-05 | End: 2018-11-05

## 2018-11-05 RX ORDER — LISINOPRIL 10 MG/1
10 TABLET ORAL DAILY
Qty: 90 TABLET | Refills: 3 | Status: SHIPPED | OUTPATIENT
Start: 2018-11-05 | End: 2020-08-26

## 2018-11-05 NOTE — PROGRESS NOTES
"  SUBJECTIVE:   Bimal Lemos is a 78 year old male who presents for Preventive Visit.    Are you in the first 12 months of your Medicare Part B coverage?  No    Physical Health:    In general, how would you rate your overall physical health? good    Outside of work, how many days during the week do you exercise? 2-3 days/week    Outside of work, approximately how many minutes a day do you exercise?45-60 minutes    If you drink alcohol do you typically have >3 drinks per day or >7 drinks per week? No    Do you usually eat at least 4 servings of fruit and vegetables a day, include whole grains & fiber and avoid regularly eating high fat or \"junk\" foods? Yes    Do you have any problems taking medications regularly?  No    Do you have any side effects from medications? not applicable    Needs assistance for the following daily activities: no assistance needed    Which of the following safety concerns are present in your home?:  none identified     Hearing impairment: No    In the past 6 months, have you been bothered by leaking of urine? no    Mental Health:    In general, how would you rate your overall mental or emotional health? good  PHQ-2 Score:      Additional concerns to address?  No    Fall risk:      Fallen 2 or more times in the past year?: No  Any fall with injury in the past year?: No    Today's Visit (Annual Physical)  Nic presents to clinic today for an annual physical. He requests to have his lisinopril to be refilled and to receive a flu shot. He has no other initial concerns today.       COGNITIVE SCREEN  1) Repeat 3 items (Leader, Season, Table)    2) Clock draw:   3) 3 item recall:   Results: 3 items recalled: COGNITIVE IMPAIRMENT LESS LIKELY    Mini-CogTM Copyright CHINO Bass. Licensed by the author for use in [facility]; reprinted with permission (bridget@.[facility]). All rights reserved.        Reviewed and updated as needed this visit by clinical staff  Tobacco  Allergies  Meds  " Problems  Med Hx  Surg Hx  Fam Hx  Soc Hx        Reviewed and updated as needed this visit by Provider        Social History   Substance Use Topics     Smoking status: Never Smoker     Smokeless tobacco: Never Used     Alcohol use 0.0 oz/week     0 Standard drinks or equivalent per week      Comment: rare     Do you feel safe in your environment - Yes    Do you have a Health Care Directive?: No: Advance care planning was reviewed with patient; patient declined at this time.    Current providers sharing in care for this patient include:   Patient Care Team:  Fredo Montalvo MD as PCP - General (Internal Medicine)    The following health maintenance items are reviewed in Epic and correct as of today:  Health Maintenance   Topic Date Due     ADVANCE DIRECTIVE PLANNING Q5 YRS  06/02/1996     INFLUENZA VACCINE (1) 09/01/2018     FALL RISK ASSESSMENT  11/29/2018     PHQ-2 Q1 YR  11/29/2018     TETANUS IMMUNIZATION (SYSTEM ASSIGNED)  10/10/2021     LIPID SCREEN Q5 YR MALE (SYSTEM ASSIGNED)  11/29/2022     PNEUMOCOCCAL  Completed     Labs reviewed in EPIC    ROS:  CONSTITUTIONAL: NEGATIVE for fever, chills, change in weight  INTEGUMENTARY/SKIN: NEGATIVE for worrisome rashes, moles or lesions  EYES: NEGATIVE for vision changes or irritation  ENT/MOUTH: NEGATIVE for ear, mouth and throat problems  RESP: NEGATIVE for significant cough or SOB, normal housework for exercise activities   BREAST: NEGATIVE for masses, tenderness or discharge  CV: NEGATIVE for chest pain, palpitations or peripheral edema  GI: NEGATIVE for nausea, abdominal pain, heartburn, or change in bowel habits, nocturia x1-2  : NEGATIVE for frequency, dysuria, or hematuria  MUSCULOSKELETAL: NEGATIVE for significant arthralgias or myalgia  NEURO: NEGATIVE for weakness, dizziness or paresthesias  ENDOCRINE: NEGATIVE for temperature intolerance, skin/hair changes  HEME: NEGATIVE for bleeding problems  PSYCHIATRIC: NEGATIVE for changes in mood or  "affect    This document serves as a record of the services and decisions personally performed and made by Fredo Montalvo MD. It was created on his behalf by Ranulfo Capone, a trained medical scribe. The creation of this document is based the provider's statements to the medical scribe.  Scribe Ranulfo Capone 10:45 AM, November 5, 2018    OBJECTIVE:   /80  Pulse 73  Temp 98.2  F (36.8  C) (Oral)  Ht 1.778 m (5' 10\")  Wt 97.5 kg (215 lb)  SpO2 96%  BMI 30.85 kg/m2 Estimated body mass index is 30.85 kg/(m^2) as calculated from the following:    Height as of this encounter: 1.778 m (5' 10\").    Weight as of this encounter: 97.5 kg (215 lb).     EXAM:   HEENT funduscopic exam was within normal limits tympanic membranes are normal nose and throat were clear  Neck was supple without adenopathy thyromegaly or masses  Chest clear to auscultation and percussion  Cardiovascular S1 and S2 are physiologic with grade 2/6 systolic murmur over the left sternal border or gallop rhythm was regular  Abdomen bowel sounds are normal there are no palpable masses organomegaly or tenderness   testes were descended without masses there is no sign of inguinal herniorrhaphy  Rectal anus was normal prostate 3-4+ and smooth, hemorrhoidal skin tag, non-friable   Extremities were nontender without any edema  Deep tendon reflexes were intact  Skin was clear  Pulses were two over 4+ bilaterally symmetrical throughout    BP Recheck: 130/80    ASSESSMENT / PLAN:   Bimal was seen today for wellness visit.    Diagnoses and all orders for this visit:    Encounter for routine adult health examination without abnormal findings  Awaiting lab results, will contact when completed; encouraged to modify lifestyle to reduce weight   -     UA reflex to Microscopic and Culture  -     CBC with platelets  -     Comprehensive metabolic panel  -     Lipid panel reflex to direct LDL Fasting  -     Vitamin D Deficiency  -     FLU VACCINE, INCREASED ANTIGEN, PRESV " "FREE    Essential hypertension  -     UA reflex to Microscopic and Culture  -     CBC with platelets  -     Comprehensive metabolic panel    Vitamin D deficiency  -     Vitamin D Deficiency    CARDIOVASCULAR SCREENING; LDL GOAL LESS THAN 130  -     Lipid panel reflex to direct LDL Fasting    Screening for prostate cancer  -     Prostate spec antigen screen    Benign essential hypertension  Well controlled, refilled, continue medications - return to clinic in 3 months for a medication recheck and to re-evaluate his weight loss  -     lisinopril (PRINIVIL/ZESTRIL) 10 MG tablet; Take 1 tablet (10 mg) by mouth daily      End of Life Planning:  Patient currently has an advanced directive: No    COUNSELING:  Reviewed preventive health counseling, as reflected in patient instructions       Regular exercise       Healthy diet/nutrition       Vision screening       Hearing screening    BP Readings from Last 1 Encounters:   11/05/18 130/80     Estimated body mass index is 30.85 kg/(m^2) as calculated from the following:    Height as of this encounter: 1.778 m (5' 10\").    Weight as of this encounter: 97.5 kg (215 lb).      Weight management plan: Discussed healthy diet and exercise guidelines and patient will follow up in 12 months in clinic to re-evaluate.     reports that he has never smoked. He has never used smokeless tobacco.      Appropriate preventive services were discussed with this patient, including applicable screening as appropriate for cardiovascular disease, diabetes, osteopenia/osteoporosis, and glaucoma.  As appropriate for age/gender, discussed screening for colorectal cancer, prostate cancer, breast cancer, and cervical cancer. Checklist reviewing preventive services available has been given to the patient.    Reviewed patients plan of care and provided an AVS. The Basic Care Plan (routine screening as documented in Health Maintenance) for Bimal meets the Care Plan requirement. This Care Plan has been " established and reviewed with the Patient.    Counseling Resources:  ATP IV Guidelines  Pooled Cohorts Equation Calculator  Breast Cancer Risk Calculator  FRAX Risk Assessment  ICSI Preventive Guidelines  Dietary Guidelines for Americans, 2010  USDA's MyPlate  ASA Prophylaxis  Lung CA Screening    The information in this document, created by the medical scribe for me, accurately reflects the services I personally performed and the decisions made by me. I have reviewed and approved this document for accuracy prior to leaving the patient care area.  11:05 AM, 11/05/18    Fredo Montalvo MD  [facility]

## 2018-11-05 NOTE — PROGRESS NOTES
Injectable Influenza Immunization Documentation    1.  Is the person to be vaccinated sick today?   No    2. Does the person to be vaccinated have an allergy to a component   of the vaccine?   No  Egg Allergy Algorithm Link    3. Has the person to be vaccinated ever had a serious reaction   to influenza vaccine in the past?   No    4. Has the person to be vaccinated ever had Guillain-Barré syndrome?   No    Form completed by Chloe Duval CMA  Prior to injection verified patient identity using patient's name and date of birth.  Due to injection administration, patient instructed to remain in clinic for 15 minutes  afterwards, and to report any adverse reaction to me immediately.

## 2018-11-05 NOTE — PATIENT INSTRUCTIONS
Preventive Health Recommendations:     See your health care provider every year to    Review health changes.     Discuss preventive care.      Review your medicines if your doctor has prescribed any.    Talk with your health care provider about whether you should have a test to screen for prostate cancer (PSA).    Every 3 years, have a diabetes test (fasting glucose). If you are at risk for diabetes, you should have this test more often.    Every 5 years, have a cholesterol test. Have this test more often if you are at risk for high cholesterol or heart disease.     Every 10 years, have a colonoscopy. Or, have a yearly FIT test (stool test). These exams will check for colon cancer.    Talk to with your health care provider about screening for Abdominal Aortic Aneurysm if you have a family history of AAA or have a history of smoking.  Shots:     Get a flu shot each year.     Get a tetanus shot every 10 years.     Talk to your doctor about your pneumonia vaccines. There are now two you should receive - Pneumovax (PPSV 23) and Prevnar (PCV 13).    Talk to your pharmacist about a shingles vaccine.     Talk to your doctor about the hepatitis B vaccine.  Nutrition:     Eat at least 5 servings of fruits and vegetables each day.     Eat whole-grain bread, whole-wheat pasta and brown rice instead of white grains and rice.     Get adequate Calcium and Vitamin D.   Lifestyle    Exercise for at least 150 minutes a week (30 minutes a day, 5 days a week). This will help you control your weight and prevent disease.     Limit alcohol to one drink per day.     No smoking.     Wear sunscreen to prevent skin cancer.     See your dentist every six months for an exam and cleaning.     See your eye doctor every 1 to 2 years to screen for conditions such as glaucoma, macular degeneration and cataracts.    Personalized Prevention Plan  You are due for the preventive services outlined below.  Your care team is available to assist you in  scheduling these services.  If you have already completed any of these items, please share that information with your care team to update in your medical record.    Health Maintenance Due   Topic Date Due     Discuss Advance Directive Planning  06/02/1996     Flu Vaccine (1) 09/01/2018     FALL RISK ASSESSMENT  11/29/2018     Depression Assessment 2 - yearly  11/29/2018

## 2018-11-05 NOTE — MR AVS SNAPSHOT
After Visit Summary   11/5/2018    Bimal Lemos    MRN: [id number]           Patient Information     Date Of Birth          1940        Visit Information        Provider Department      11/5/2018 10:30 AM Fredo Montalvo MD [facility]        Today's Diagnoses     Encounter for routine adult health examination without abnormal findings    -  1    Essential hypertension        Vitamin D deficiency        CARDIOVASCULAR SCREENING; LDL GOAL LESS THAN 130        Screening for prostate cancer        Benign essential hypertension        Need for prophylactic vaccination and inoculation against influenza          Care Instructions      Preventive Health Recommendations:     See your health care provider every year to    Review health changes.     Discuss preventive care.      Review your medicines if your doctor has prescribed any.    Talk with your health care provider about whether you should have a test to screen for prostate cancer (PSA).    Every 3 years, have a diabetes test (fasting glucose). If you are at risk for diabetes, you should have this test more often.    Every 5 years, have a cholesterol test. Have this test more often if you are at risk for high cholesterol or heart disease.     Every 10 years, have a colonoscopy. Or, have a yearly FIT test (stool test). These exams will check for colon cancer.    Talk to with your health care provider about screening for Abdominal Aortic Aneurysm if you have a family history of AAA or have a history of smoking.  Shots:     Get a flu shot each year.     Get a tetanus shot every 10 years.     Talk to your doctor about your pneumonia vaccines. There are now two you should receive - Pneumovax (PPSV 23) and Prevnar (PCV 13).    Talk to your pharmacist about a shingles vaccine.     Talk to your doctor about the hepatitis B vaccine.  Nutrition:     Eat at least 5 servings of fruits and vegetables each day.     Eat whole-grain bread,  whole-wheat pasta and brown rice instead of white grains and rice.     Get adequate Calcium and Vitamin D.   Lifestyle    Exercise for at least 150 minutes a week (30 minutes a day, 5 days a week). This will help you control your weight and prevent disease.     Limit alcohol to one drink per day.     No smoking.     Wear sunscreen to prevent skin cancer.     See your dentist every six months for an exam and cleaning.     See your eye doctor every 1 to 2 years to screen for conditions such as glaucoma, macular degeneration and cataracts.    Personalized Prevention Plan  You are due for the preventive services outlined below.  Your care team is available to assist you in scheduling these services.  If you have already completed any of these items, please share that information with your care team to update in your medical record.    Health Maintenance Due   Topic Date Due     Discuss Advance Directive Planning  06/02/1996     Flu Vaccine (1) 09/01/2018     FALL RISK ASSESSMENT  11/29/2018     Depression Assessment 2 - yearly  11/29/2018             Follow-ups after your visit        Follow-up notes from your care team     Return in about 3 months (around 2/5/2019).      Who to contact     If you have questions or need follow up information about today's clinic visit or your schedule please contact [facility] directly at [phone].  Normal or non-critical lab and imaging results will be communicated to you by MyChart, letter or phone within 4 business days after the clinic has received the results. If you do not hear from us within 7 days, please contact the clinic through MyChart or phone. If you have a critical or abnormal lab result, we will notify you by phone as soon as possible.  Submit refill requests through [employer] or call your pharmacy and they will forward the refill request to us. Please allow 3 business days for your refill to be completed.          Additional Information About Your Visit    "     Care EveryWhere ID     This is your Care EveryWhere ID. This could be used by other organizations to access your [facility] medical records  [id number]        Your Vitals Were     Pulse Temperature Height Pulse Oximetry BMI (Body Mass Index)       73 98.2  F (36.8  C) (Oral) 5' 10\" (1.778 m) 96% 30.85 kg/m2        Blood Pressure from Last 3 Encounters:   11/05/18 130/80   03/01/18 124/83   11/29/17 136/80    Weight from Last 3 Encounters:   11/05/18 215 lb (97.5 kg)   03/01/18 200 lb (90.7 kg)   11/29/17 205 lb (93 kg)              We Performed the Following     CBC with platelets     Comprehensive metabolic panel     FLU VACCINE, INCREASED ANTIGEN, PRESV FREE     Lipid panel reflex to direct LDL Fasting     Prostate spec antigen screen     UA reflex to Microscopic and Culture     Vitamin D Deficiency          Today's Medication Changes          These changes are accurate as of 11/5/18 11:38 AM.  If you have any questions, ask your nurse or doctor.               Start taking these medicines.        Dose/Directions    lisinopril 10 MG tablet   Commonly known as:  PRINIVIL/ZESTRIL   Used for:  Benign essential hypertension   Started by:  Fredo Montalvo MD        Dose:  10 mg   Take 1 tablet (10 mg) by mouth daily   Quantity:  90 tablet   Refills:  3            Where to get your medicines      These medications were sent to [facility] [address] [place] [id number]     Phone:  [phone]     lisinopril 10 MG tablet                Primary Care Provider Office Phone # Fax #    Fredo Montalvo [phone] [address] [facility] [address]  [place] [address]        Equal Access to Services     THAD LACEY AH: Hadii sara henderson Sohumphrey, waaxda luqadaha, qaybta kaalmada adeegyada, elidai solano. Kassie [facility] [phone].    ATENCIÓN: Si habla español, tiene a zaragoza disposición servicios gratuitos de " asistencia lingüística. Devaughn al [phone].    We comply with applicable federal civil rights laws and [place] laws. We do not discriminate on the basis of race, color, national origin, age, disability, sex, sexual orientation, or gender identity.            Thank you!     Thank you for choosing [facility]  for your care. Our goal is always to provide you with excellent care. Hearing back from our patients is one way we can continue to improve our services. Please take a few minutes to complete the written survey that you may receive in the mail after your visit with us. Thank you!             Your Updated Medication List - Protect others around you: Learn how to safely use, store and throw away your medicines at www.disposemymeds.org.          This list is accurate as of 11/5/18 11:38 AM.  Always use your most recent med list.                   Brand Name Dispense Instructions for use Diagnosis    lisinopril 10 MG tablet    PRINIVIL/ZESTRIL    90 tablet    Take 1 tablet (10 mg) by mouth daily    Benign essential hypertension       VITAMIN D3 PO      Take 2,000 Units by mouth daily

## 2018-11-06 LAB — DEPRECATED CALCIDIOL+CALCIFEROL SERPL-MC: 35 UG/L (ref 20–75)

## 2020-01-13 ENCOUNTER — OFFICE VISIT (OUTPATIENT)
Dept: FAMILY MEDICINE | Facility: CLINIC | Age: 80
End: 2020-01-13
Payer: COMMERCIAL

## 2020-01-13 VITALS
HEART RATE: 97 BPM | SYSTOLIC BLOOD PRESSURE: 139 MMHG | HEIGHT: 70 IN | WEIGHT: 227.1 LBS | BODY MASS INDEX: 32.51 KG/M2 | OXYGEN SATURATION: 94 % | DIASTOLIC BLOOD PRESSURE: 89 MMHG | TEMPERATURE: 97.7 F

## 2020-01-13 DIAGNOSIS — E55.9 VITAMIN D DEFICIENCY: ICD-10-CM

## 2020-01-13 DIAGNOSIS — I10 ESSENTIAL HYPERTENSION: Primary | ICD-10-CM

## 2020-01-13 PROCEDURE — 99213 OFFICE O/P EST LOW 20 MIN: CPT | Performed by: INTERNAL MEDICINE

## 2020-01-13 RX ORDER — LISINOPRIL 20 MG/1
20 TABLET ORAL DAILY
Qty: 60 TABLET | Refills: 3 | Status: SHIPPED | OUTPATIENT
Start: 2020-01-13 | End: 2020-08-18

## 2020-01-13 ASSESSMENT — MIFFLIN-ST. JEOR: SCORE: 1756.37

## 2020-01-13 NOTE — PROGRESS NOTES
Subjective     Bimal Lemos is a 79 year old male who presents to clinic today for the following health issues:    HPI   Medication Followup of Lisinopril    Taking Medication as prescribed: yes    Side Effects:  None    Medication Helping Symptoms:  yes   The pt presents to the clinic for a f/u on his lisinopril Rx. He reports that he doesn't check his BP frequently at home. The pt reports that he does not have a set exercise routine, but rather does frequent yard work and chores around the house.     Patient Active Problem List   Diagnosis     Hypertension     Sebaceous cyst     Skin tag     Vitamin D deficiency     Past Surgical History:   Procedure Laterality Date     CATARACT IOL, RT/LT         Social History     Tobacco Use     Smoking status: Never Smoker     Smokeless tobacco: Never Used   Substance Use Topics     Alcohol use: Yes     Alcohol/week: 0.0 standard drinks     Comment: rare     History reviewed. No pertinent family history.      Current Outpatient Medications   Medication Sig Dispense Refill     Cholecalciferol (VITAMIN D3 PO) Take 2,000 Units by mouth daily        lisinopril (PRINIVIL/ZESTRIL) 10 MG tablet Take 1 tablet (10 mg) by mouth daily 90 tablet 3     lisinopril (PRINIVIL/ZESTRIL) 20 MG tablet Take 1 tablet (20 mg) by mouth daily 60 tablet 3     No Known Allergies    Reviewed and updated as needed this visit by Provider         Review of Systems   ROS COMP: Constitutional, HEENT, cardiovascular, pulmonary, gi and gu systems are negative, except as otherwise noted.    This document serves as a record of the services and decisions personally performed and made by Fredo Montalvo MD. It was created on his behalf by Juan Antonio Luque, a trained medical scribe. The creation of this document is based on the provider's statements to the medical scribe.  Juan Antonio Luque January 13, 2020 4:41 PM          Objective    /89 (BP Location: Right arm, Patient Position: Sitting, Cuff Size: Adult  "Regular)   Pulse 97   Temp 97.7  F (36.5  C) (Oral)   Ht 1.778 m (5' 10\")   Wt 103 kg (227 lb 1.6 oz)   SpO2 94%   BMI 32.59 kg/m    Body mass index is 32.59 kg/m .     BP Recheck   139/89    Physical Exam   Gained 12 lbs since LOV  Neck was supple without adenopathy or thyromegaly his carotids were normal without bruits  Chest clear to auscultation and percussion  Cardiovascular S1 and S2 are physiologic without murmurs or gallops  Abdomen bowel sounds were normal.  There is no palpable mass or organomegaly  Extremities nontender with 1+ pretibial edema  Pulses pedal pulses are as described otherwise his pulses are bilaterally symmetrical throughout without bruits  Skin without significant abnormality      Diagnostic Test Results:  Labs reviewed in Epic  none         Assessment & Plan     Essential hypertension  Increased the pt's Lisinopril dosage to 20 mg daily from 10 mg daily. Highly encouraged the pt to increase his exercise routine to better manage his BP. Suggested going to the mall for walking. Recommended walking for 30 minutes 4x week. The pt should start checking his BP BID.   - lisinopril (PRINIVIL/ZESTRIL) 20 MG tablet  Dispense: 60 tablet; Refill: 3    Vitamin D deficiency           BMI:   Estimated body mass index is 32.59 kg/m  as calculated from the following:    Height as of this encounter: 1.778 m (5' 10\").    Weight as of this encounter: 103 kg (227 lb 1.6 oz).   Weight management plan: Discussed healthy diet and exercise guidelines        FUTURE APPOINTMENTS:       - Follow-up visit in March     Return in about 2 months (around 3/13/2020) for Follow Up.    The information in this document, created by the medical scribe for me, accurately reflects the services I personally performed and the decisions made by me. I have reviewed and approved this document for accuracy prior to leaving the patient care area.  January 13, 2020 5:02 PM    Fredo Montalvo MD  [facility]    "

## 2020-08-14 DIAGNOSIS — I10 ESSENTIAL HYPERTENSION: ICD-10-CM

## 2020-08-17 NOTE — TELEPHONE ENCOUNTER
Routing refill request to provider for review/approval because:  Last labs 2018    Creatinine   Date Value Ref Range Status   11/05/2018 1.01 0.66 - 1.25 mg/dL Final     Potassium   Date Value Ref Range Status   11/05/2018 3.8 3.4 - 5.3 mmol/L Final       Mariza Clement RN

## 2020-08-18 RX ORDER — LISINOPRIL 20 MG/1
TABLET ORAL
Qty: 60 TABLET | Refills: 2 | Status: SHIPPED | OUTPATIENT
Start: 2020-08-18 | End: 2020-08-26

## 2020-08-26 ENCOUNTER — OFFICE VISIT (OUTPATIENT)
Dept: FAMILY MEDICINE | Facility: CLINIC | Age: 80
End: 2020-08-26
Payer: COMMERCIAL

## 2020-08-26 VITALS
HEIGHT: 70 IN | OXYGEN SATURATION: 95 % | BODY MASS INDEX: 31.78 KG/M2 | TEMPERATURE: 98.9 F | WEIGHT: 222 LBS | SYSTOLIC BLOOD PRESSURE: 151 MMHG | HEART RATE: 95 BPM | DIASTOLIC BLOOD PRESSURE: 92 MMHG

## 2020-08-26 DIAGNOSIS — Z13.29 SCREENING FOR HYPOTHYROIDISM: ICD-10-CM

## 2020-08-26 DIAGNOSIS — Z00.00 ENCOUNTER FOR ROUTINE ADULT HEALTH EXAMINATION WITHOUT ABNORMAL FINDINGS: ICD-10-CM

## 2020-08-26 DIAGNOSIS — Z12.5 SCREENING FOR PROSTATE CANCER: ICD-10-CM

## 2020-08-26 DIAGNOSIS — I10 ESSENTIAL HYPERTENSION: Primary | ICD-10-CM

## 2020-08-26 DIAGNOSIS — Z13.6 CARDIOVASCULAR SCREENING; LDL GOAL LESS THAN 130: ICD-10-CM

## 2020-08-26 DIAGNOSIS — E55.9 VITAMIN D DEFICIENCY: ICD-10-CM

## 2020-08-26 LAB
ALBUMIN UR-MCNC: NEGATIVE MG/DL
APPEARANCE UR: CLEAR
BILIRUB UR QL STRIP: NEGATIVE
COLOR UR AUTO: YELLOW
ERYTHROCYTE [DISTWIDTH] IN BLOOD BY AUTOMATED COUNT: 13.9 % (ref 10–15)
GLUCOSE UR STRIP-MCNC: NEGATIVE MG/DL
HCT VFR BLD AUTO: 49.3 % (ref 40–53)
HGB BLD-MCNC: 16.9 G/DL (ref 13.3–17.7)
HGB UR QL STRIP: NEGATIVE
KETONES UR STRIP-MCNC: NEGATIVE MG/DL
LEUKOCYTE ESTERASE UR QL STRIP: NEGATIVE
MCH RBC QN AUTO: 30.1 PG (ref 26.5–33)
MCHC RBC AUTO-ENTMCNC: 34.3 G/DL (ref 31.5–36.5)
MCV RBC AUTO: 88 FL (ref 78–100)
NITRATE UR QL: NEGATIVE
PH UR STRIP: 5.5 PH (ref 5–7)
PLATELET # BLD AUTO: 197 10E9/L (ref 150–450)
RBC # BLD AUTO: 5.61 10E12/L (ref 4.4–5.9)
SOURCE: NORMAL
SP GR UR STRIP: 1.02 (ref 1–1.03)
UROBILINOGEN UR STRIP-ACNC: 1 EU/DL (ref 0.2–1)
WBC # BLD AUTO: 7 10E9/L (ref 4–11)

## 2020-08-26 PROCEDURE — G0103 PSA SCREENING: HCPCS | Performed by: INTERNAL MEDICINE

## 2020-08-26 PROCEDURE — 85027 COMPLETE CBC AUTOMATED: CPT | Performed by: INTERNAL MEDICINE

## 2020-08-26 PROCEDURE — 80053 COMPREHEN METABOLIC PANEL: CPT | Performed by: INTERNAL MEDICINE

## 2020-08-26 PROCEDURE — 36415 COLL VENOUS BLD VENIPUNCTURE: CPT | Performed by: INTERNAL MEDICINE

## 2020-08-26 PROCEDURE — 84443 ASSAY THYROID STIM HORMONE: CPT | Performed by: INTERNAL MEDICINE

## 2020-08-26 PROCEDURE — 82306 VITAMIN D 25 HYDROXY: CPT | Performed by: INTERNAL MEDICINE

## 2020-08-26 PROCEDURE — 81003 URINALYSIS AUTO W/O SCOPE: CPT | Performed by: INTERNAL MEDICINE

## 2020-08-26 PROCEDURE — 99397 PER PM REEVAL EST PAT 65+ YR: CPT | Performed by: INTERNAL MEDICINE

## 2020-08-26 PROCEDURE — 80061 LIPID PANEL: CPT | Performed by: INTERNAL MEDICINE

## 2020-08-26 RX ORDER — LISINOPRIL 20 MG/1
20 TABLET ORAL DAILY
Qty: 90 TABLET | Refills: 3 | Status: SHIPPED | OUTPATIENT
Start: 2020-08-26 | End: 2021-09-23

## 2020-08-26 ASSESSMENT — MIFFLIN-ST. JEOR: SCORE: 1728.24

## 2020-08-26 ASSESSMENT — ACTIVITIES OF DAILY LIVING (ADL): CURRENT_FUNCTION: NO ASSISTANCE NEEDED

## 2020-08-26 NOTE — PROGRESS NOTES
"SUBJECTIVE:   Bimal Lemos is a 80 year old male who presents for Preventive Visit.    Are you in the first 12 months of your Medicare coverage?  No    Healthy Habits:     In general, how would you rate your overall health?  Very good    Frequency of exercise:  None    Do you usually eat at least 4 servings of fruit and vegetables a day, include whole grains    & fiber and avoid regularly eating high fat or \"junk\" foods?  No    Taking medications regularly:  Yes    Barriers to taking medications:  None    Medication side effects:  None    Ability to successfully perform activities of daily living:  No assistance needed    Home Safety:  No safety concerns identified    Hearing Impairment:  No hearing concerns    In the past 6 months, have you been bothered by leaking of urine?  No    In general, how would you rate your overall mental or emotional health?  Excellent      PHQ-2 Total Score: 0    Additional concerns today:  No    Do you feel safe in your environment? Yes    Have you ever done Advance Care Planning? (For example, a Health Directive, POLST, or a discussion with a medical provider or your loved ones about your wishes): No, advance care planning information given to patient to review.  Patient declined advance care planning discussion at this time.      Fall risk  Fallen 2 or more times in the past year?: No  Any fall with injury in the past year?: No    Cognitive Screening   1) Repeat 3 items (Leader, Season, Table)    2) Clock draw: NORMAL  3) 3 item recall: Recalls NO objects   Results: NORMAL clock, 0 recalled: COGNITIVE IMPAIRMENT LESS LIKELY    Mini-CogTM Copyright CHINO Bass. Licensed by the author for use in [facility]; reprinted with permission (bridget@.[facility]). All rights reserved.      Do you have sleep apnea, excessive snoring or daytime drowsiness?: no    Reviewed and updated as needed this visit by clinical staff  Tobacco  Allergies  Meds         Reviewed and updated as " needed this visit by Provider        Social History     Tobacco Use     Smoking status: Never Smoker     Smokeless tobacco: Never Used   Substance Use Topics     Alcohol use: Yes     Alcohol/week: 0.0 standard drinks     Comment: rare     If you drink alcohol do you typically have >3 drinks per day or >7 drinks per week? No    Alcohol Use 11/29/2017   Prescreen: >3 drinks/day or >7 drinks/week? The patient does not drink >3 drinks per day nor >7 drinks per week.               Current providers sharing in care for this patient include:   Patient Care Team:  Fredo Montalvo MD as PCP - General (Internal Medicine)  Fredo Montalvo MD as Assigned PCP    The following health maintenance items are reviewed in Epic and correct as of today:  Health Maintenance   Topic Date Due     ADVANCE CARE PLANNING  1940     ZOSTER IMMUNIZATION (1 of 2) 06/02/1991     MEDICARE ANNUAL WELLNESS VISIT  11/05/2019     INFLUENZA VACCINE (1) 09/01/2020     FALL RISK ASSESSMENT  01/13/2021     DTAP/TDAP/TD IMMUNIZATION (3 - Td) 10/10/2021     LIPID  11/05/2023     PHQ-2  Completed     PNEUMOCOCCAL IMMUNIZATION 65+ LOW/MEDIUM RISK  Completed     IPV IMMUNIZATION  Aged Out     MENINGITIS IMMUNIZATION  Aged Out     HEPATITIS B IMMUNIZATION  Aged Out     Current Outpatient Medications   Medication Sig Dispense Refill     Cholecalciferol (VITAMIN D3 PO) Take 5,000 Units by mouth daily        lisinopril (ZESTRIL) 20 MG tablet Take 1 tablet (20 mg) by mouth daily 90 tablet 3     No Known Allergies      Review of Systems  CONSTITUTIONAL: NEGATIVE for fever, chills, change in weight  INTEGUMENTARY/SKIN: NEGATIVE for worrisome rashes, moles or lesions  EYES: NEGATIVE for vision changes or irritation  ENT/MOUTH: NEGATIVE for ear, mouth and throat problems  RESP: NEGATIVE for significant cough or SOB  BREAST: NEGATIVE for masses, tenderness or discharge  CV: NEGATIVE for chest pain, palpitations or peripheral edema  GI: NEGATIVE for nausea,  "abdominal pain, heartburn, or change in bowel habits  : NEGATIVE for frequency, dysuria, or hematuria  MUSCULOSKELETAL: NEGATIVE for significant arthralgias or myalgia  NEURO: NEGATIVE for weakness, dizziness or paresthesias  ENDOCRINE: NEGATIVE for temperature intolerance, skin/hair changes  HEME: NEGATIVE for bleeding problems  PSYCHIATRIC: NEGATIVE for changes in mood or affect    OBJECTIVE:   BP (!) 151/92 (BP Location: Left arm, Patient Position: Sitting)   Pulse 95   Temp 98.9  F (37.2  C) (Tympanic)   Ht 1.778 m (5' 10\")   Wt 100.7 kg (222 lb)   SpO2 95%   BMI 31.85 kg/m   Estimated body mass index is 31.85 kg/m  as calculated from the following:    Height as of this encounter: 1.778 m (5' 10\").    Weight as of this encounter: 100.7 kg (222 lb).  Physical Exam  GENERAL: healthy, alert and no distress  EYES: Eyes grossly normal to inspection, PERRL and conjunctivae and sclerae normal  HENT: ear canals and TM's normal, nose and mouth without ulcers or lesions  NECK: no adenopathy, no asymmetry, masses, or scars and thyroid normal to palpation  RESP: lungs clear to auscultation - no rales, rhonchi or wheezes  CV: regular rate and rhythm, normal S1 S2, no S3 or S4, no murmur, click or rub, no peripheral edema and peripheral pulses strong  ABDOMEN: soft, nontender, no hepatosplenomegaly, no masses and bowel sounds normal   testicles were normal there is no inguinal masses or herniorrhaphy  Rectal anus was negative prostate gland was 2-3+ and smooth  MS: no gross musculoskeletal defects noted, no edema  SKIN: no suspicious lesions or rashes  NEURO: Normal strength and tone, mentation intact and speech normal  PSYCH: mentation appears normal, affect normal/bright        ASSESSMENT / PLAN:   1. Essential hypertension    - UA reflex to Microscopic and Culture  - CBC with platelets  - Comprehensive metabolic panel  - lisinopril (ZESTRIL) 20 MG tablet; Take 1 tablet (20 mg) by mouth daily  Dispense: 90 tablet; " "Refill: 3    2. Screening for prostate cancer    - Prostate spec antigen screen    3. Vitamin D deficiency    - Vitamin D Deficiency    4. Encounter for routine adult health examination without abnormal findings    - UA reflex to Microscopic and Culture  - CBC with platelets  - Comprehensive metabolic panel  - Lipid panel reflex to direct LDL Fasting  - Vitamin D Deficiency  - TSH with free T4 reflex  - Prostate spec antigen screen    5. CARDIOVASCULAR SCREENING; LDL GOAL LESS THAN 130    - Lipid panel reflex to direct LDL Fasting    6. Screening for hypothyroidism    - TSH with free T4 reflex    COUNSELING:      Estimated body mass index is 31.85 kg/m  as calculated from the following:    Height as of this encounter: 1.778 m (5' 10\").    Weight as of this encounter: 100.7 kg (222 lb).        He reports that he has never smoked. He has never used smokeless tobacco.      Appropriate preventive services were discussed with this patient, including applicable screening as appropriate for cardiovascular disease, diabetes, osteopenia/osteoporosis, and glaucoma.  As appropriate for age/gender, discussed screening for colorectal cancer, prostate cancer, breast cancer, and cervical cancer. Checklist reviewing preventive services available has been given to the patient.    Reviewed patients plan of care and provided an AVS. The Basic Care Plan (routine screening as documented in Health Maintenance) for Bimal meets the Care Plan requirement. This Care Plan has been established and reviewed with the Patient.    Counseling Resources:  ATP IV Guidelines  Pooled Cohorts Equation Calculator  Breast Cancer Risk Calculator  Breast Cancer: Medication to Reduce Risk  FRAX Risk Assessment  ICSI Preventive Guidelines  Dietary Guidelines for Americans, 2010  MyWants's MyPlate  ASA Prophylaxis  Lung CA Screening  Return to clinic in 6 months  Fredo Montalvo MD  [facility]    Identified Health Risks:  "

## 2020-08-26 NOTE — LETTER
[facility]  [address] [facility]  [address]  [place]  [address]  Tel: [phone]    September 8, 2020    Bimal Lemos  [address]   [place] [address]        Dear Ander Lemos,    It was good to see you for your wellness exam and enclosed are your lab reports.   The TSH is a sensitive indicator of thyroid function your TSH was in a very good normal range.     The lipid profile showed that your total cholesterol was 178 as compared to 168-year before.  The triglycerides were 144, up from 107 from a year ago and anything less than 150 is normal but anything less than 100 is good.  The triglycerides are intimately related to carbohydrates in your diet.  The HDL or good cholesterol was 50, the same as a year ago and anything greater than 40 is normal.  The HDL cholesterol is intimately related to regular aerobic activity.  The most important factor is the LDL or bad cholesterol which was 99, essentially the same as 97 from a year ago and our goal is to keep this under 100.  The LDL cholesterol is intimately related to fats in your diet.     PSA is a sensitive screening test for prostate cancer and your PSA was 4.68, better than 7.17 and most likely represents age-related enlargement of your prostate gland.     The comprehensive metabolic panel shows that your minerals and electrolytes, kidney function and liver function tests were all normal.  Your fasting blood sugar is at the upper limits of normal.   Vitamin D is important for bone health and your vitamin D level remains within the normal range.   The routine urinalysis was normal.   The CBC shows your blood cell counts were all normal, no sign of low blood or anemia.     We should plan on rechecking your blood pressure in the near future.  If you are checking it regularly at home please contact me with your readings or you could come to the office for a nurses visit to recheck your blood pressure.  Anticipate following up with us in near  future.  If any questions regarding these reports please let me know.     Sincerely,    Fredo Montalvo MD          Enclosure: Lab Results  Results for orders placed or performed in visit on 08/26/20   UA reflex to Microscopic and Culture     Status: None    Specimen: Midstream Urine   Result Value Ref Range    Color Urine Yellow     Appearance Urine Clear     Glucose Urine Negative NEG^Negative mg/dL    Bilirubin Urine Negative NEG^Negative    Ketones Urine Negative NEG^Negative mg/dL    Specific Gravity Urine 1.025 1.003 - 1.035    Blood Urine Negative NEG^Negative    pH Urine 5.5 5.0 - 7.0 pH    Protein Albumin Urine Negative NEG^Negative mg/dL    Urobilinogen Urine 1.0 0.2 - 1.0 EU/dL    Nitrite Urine Negative NEG^Negative    Leukocyte Esterase Urine Negative NEG^Negative    Source Midstream Urine    CBC with platelets     Status: None   Result Value Ref Range    WBC 7.0 4.0 - 11.0 10e9/L    RBC Count 5.61 4.4 - 5.9 10e12/L    Hemoglobin 16.9 13.3 - 17.7 g/dL    Hematocrit 49.3 40.0 - 53.0 %    MCV 88 78 - 100 fl    MCH 30.1 26.5 - 33.0 pg    MCHC 34.3 31.5 - 36.5 g/dL    RDW 13.9 10.0 - 15.0 %    Platelet Count 197 150 - 450 10e9/L   Comprehensive metabolic panel     Status: Abnormal   Result Value Ref Range    Sodium 140 133 - 144 mmol/L    Potassium 4.0 3.4 - 5.3 mmol/L    Chloride 108 94 - 109 mmol/L    Carbon Dioxide 26 20 - 32 mmol/L    Anion Gap 6 3 - 14 mmol/L    Glucose 100 (H) 70 - 99 mg/dL    Urea Nitrogen 21 7 - 30 mg/dL    Creatinine 1.09 0.66 - 1.25 mg/dL    GFR Estimate 64 >60 mL/min/[1.73_m2]    GFR Estimate If Black 74 >60 mL/min/[1.73_m2]    Calcium 9.4 8.5 - 10.1 mg/dL    Bilirubin Total 1.1 0.2 - 1.3 mg/dL    Albumin 3.7 3.4 - 5.0 g/dL    Protein Total 7.2 6.8 - 8.8 g/dL    Alkaline Phosphatase 84 40 - 150 U/L    ALT 21 0 - 70 U/L    AST 15 0 - 45 U/L   Lipid panel reflex to direct LDL Fasting     Status: None   Result Value Ref Range    Cholesterol 178 <200 mg/dL    Triglycerides 144 <150  mg/dL    HDL Cholesterol 50 >39 mg/dL    LDL Cholesterol Calculated 99 <100 mg/dL    Non HDL Cholesterol 128 <130 mg/dL   Vitamin D Deficiency     Status: None   Result Value Ref Range    Vitamin D Deficiency screening 50 20 - 75 ug/L   TSH with free T4 reflex     Status: None   Result Value Ref Range    TSH 2.14 0.40 - 4.00 mU/L   Prostate spec antigen screen     Status: Abnormal   Result Value Ref Range    PSA 4.68 (H) 0 - 4 ug/L

## 2020-08-27 LAB
ALBUMIN SERPL-MCNC: 3.7 G/DL (ref 3.4–5)
ALP SERPL-CCNC: 84 U/L (ref 40–150)
ALT SERPL W P-5'-P-CCNC: 21 U/L (ref 0–70)
ANION GAP SERPL CALCULATED.3IONS-SCNC: 6 MMOL/L (ref 3–14)
AST SERPL W P-5'-P-CCNC: 15 U/L (ref 0–45)
BILIRUB SERPL-MCNC: 1.1 MG/DL (ref 0.2–1.3)
BUN SERPL-MCNC: 21 MG/DL (ref 7–30)
CALCIUM SERPL-MCNC: 9.4 MG/DL (ref 8.5–10.1)
CHLORIDE SERPL-SCNC: 108 MMOL/L (ref 94–109)
CHOLEST SERPL-MCNC: 178 MG/DL
CO2 SERPL-SCNC: 26 MMOL/L (ref 20–32)
CREAT SERPL-MCNC: 1.09 MG/DL (ref 0.66–1.25)
DEPRECATED CALCIDIOL+CALCIFEROL SERPL-MC: 50 UG/L (ref 20–75)
GFR SERPL CREATININE-BSD FRML MDRD: 64 ML/MIN/{1.73_M2}
GLUCOSE SERPL-MCNC: 100 MG/DL (ref 70–99)
HDLC SERPL-MCNC: 50 MG/DL
LDLC SERPL CALC-MCNC: 99 MG/DL
NONHDLC SERPL-MCNC: 128 MG/DL
POTASSIUM SERPL-SCNC: 4 MMOL/L (ref 3.4–5.3)
PROT SERPL-MCNC: 7.2 G/DL (ref 6.8–8.8)
PSA SERPL-ACNC: 4.68 UG/L (ref 0–4)
SODIUM SERPL-SCNC: 140 MMOL/L (ref 133–144)
TRIGL SERPL-MCNC: 144 MG/DL
TSH SERPL DL<=0.005 MIU/L-ACNC: 2.14 MU/L (ref 0.4–4)

## 2021-02-10 ENCOUNTER — IMMUNIZATION (OUTPATIENT)
Dept: NURSING | Facility: CLINIC | Age: 81
End: 2021-02-10
Payer: COMMERCIAL

## 2021-02-10 PROCEDURE — 91300 PR COVID VAC PFIZER DIL RECON 30 MCG/0.3 ML IM: CPT

## 2021-02-10 PROCEDURE — 0001A PR COVID VAC PFIZER DIL RECON 30 MCG/0.3 ML IM: CPT

## 2021-03-03 ENCOUNTER — IMMUNIZATION (OUTPATIENT)
Dept: NURSING | Facility: CLINIC | Age: 81
End: 2021-03-03
Attending: INTERNAL MEDICINE
Payer: COMMERCIAL

## 2021-03-03 PROCEDURE — 0002A PR COVID VAC PFIZER DIL RECON 30 MCG/0.3 ML IM: CPT

## 2021-03-03 PROCEDURE — 91300 PR COVID VAC PFIZER DIL RECON 30 MCG/0.3 ML IM: CPT

## 2021-09-05 ENCOUNTER — HEALTH MAINTENANCE LETTER (OUTPATIENT)
Age: 81
End: 2021-09-05

## 2021-09-21 ENCOUNTER — OFFICE VISIT (OUTPATIENT)
Dept: FAMILY MEDICINE | Facility: CLINIC | Age: 81
End: 2021-09-21
Payer: COMMERCIAL

## 2021-09-21 VITALS
HEIGHT: 70 IN | WEIGHT: 222 LBS | HEART RATE: 83 BPM | BODY MASS INDEX: 31.78 KG/M2 | DIASTOLIC BLOOD PRESSURE: 89 MMHG | SYSTOLIC BLOOD PRESSURE: 152 MMHG | TEMPERATURE: 97.3 F | RESPIRATION RATE: 16 BRPM | OXYGEN SATURATION: 95 %

## 2021-09-21 DIAGNOSIS — I10 ESSENTIAL HYPERTENSION: Primary | ICD-10-CM

## 2021-09-21 DIAGNOSIS — Z12.5 ENCOUNTER FOR SCREENING FOR MALIGNANT NEOPLASM OF PROSTATE: ICD-10-CM

## 2021-09-21 LAB
BASOPHILS # BLD AUTO: 0 10E3/UL (ref 0–0.2)
BASOPHILS NFR BLD AUTO: 0 %
EOSINOPHIL # BLD AUTO: 0.1 10E3/UL (ref 0–0.7)
EOSINOPHIL NFR BLD AUTO: 2 %
ERYTHROCYTE [DISTWIDTH] IN BLOOD BY AUTOMATED COUNT: 14 % (ref 10–15)
HCT VFR BLD AUTO: 48.7 % (ref 40–53)
HGB BLD-MCNC: 16.8 G/DL (ref 13.3–17.7)
LYMPHOCYTES # BLD AUTO: 1.5 10E3/UL (ref 0.8–5.3)
LYMPHOCYTES NFR BLD AUTO: 23 %
MCH RBC QN AUTO: 30.1 PG (ref 26.5–33)
MCHC RBC AUTO-ENTMCNC: 34.5 G/DL (ref 31.5–36.5)
MCV RBC AUTO: 87 FL (ref 78–100)
MONOCYTES # BLD AUTO: 0.7 10E3/UL (ref 0–1.3)
MONOCYTES NFR BLD AUTO: 10 %
NEUTROPHILS # BLD AUTO: 4.2 10E3/UL (ref 1.6–8.3)
NEUTROPHILS NFR BLD AUTO: 65 %
PLATELET # BLD AUTO: 241 10E3/UL (ref 150–450)
RBC # BLD AUTO: 5.58 10E6/UL (ref 4.4–5.9)
WBC # BLD AUTO: 6.5 10E3/UL (ref 4–11)

## 2021-09-21 PROCEDURE — 80053 COMPREHEN METABOLIC PANEL: CPT | Performed by: INTERNAL MEDICINE

## 2021-09-21 PROCEDURE — 85025 COMPLETE CBC W/AUTO DIFF WBC: CPT | Performed by: INTERNAL MEDICINE

## 2021-09-21 PROCEDURE — 36415 COLL VENOUS BLD VENIPUNCTURE: CPT | Performed by: INTERNAL MEDICINE

## 2021-09-21 PROCEDURE — 99214 OFFICE O/P EST MOD 30 MIN: CPT | Performed by: INTERNAL MEDICINE

## 2021-09-21 PROCEDURE — G0103 PSA SCREENING: HCPCS | Performed by: INTERNAL MEDICINE

## 2021-09-21 PROCEDURE — 84443 ASSAY THYROID STIM HORMONE: CPT | Performed by: INTERNAL MEDICINE

## 2021-09-21 ASSESSMENT — MIFFLIN-ST. JEOR: SCORE: 1723.24

## 2021-09-21 NOTE — PROGRESS NOTES
"    Assessment & Plan     Essential hypertension  Patient doing well on lisinopril.  Blood pressures are well controlled at home.  Subtle elevation noted in the clinic today.    - Comprehensive metabolic panel (BMP + Alb, Alk Phos, ALT, AST, Total. Bili, TP); Future  - CBC with platelets and differential; Future  - TSH with free T4 reflex; Future  - PSA, screen; Future  - Comprehensive metabolic panel (BMP + Alb, Alk Phos, ALT, AST, Total. Bili, TP)  - CBC with platelets and differential  - TSH with free T4 reflex  - PSA, screen    Encounter for screening for malignant neoplasm of prostate   Minimal urinary symptoms mainly of nocturia  - PSA, screen; Future  - PSA, screen             BMI:   Estimated body mass index is 31.85 kg/m  as calculated from the following:    Height as of this encounter: 1.778 m (5' 10\").    Weight as of this encounter: 100.7 kg (222 lb).   Patient working on diet and exercise    See Patient Instructions    No follow-ups on file.    Nelson Enriquez MD  [facility] [place] is a 81 year old who presents for the following health issues     HPI 81-year-old gentleman presents to clinic today to establish care.  He has a history of hypertension and needs refills on his conditions.    The most part he is doing well no chest pain or shortness of breath no endocrine hematologic or allergic symptoms of any significance.    He keeps himself active.  He is a retired ophthalmologist.  He continues to involve himself in the scientific community.  As well, keeps himself busy with activities around the home.    New Patient/Transfer of Care  New Patient/Transfer of Care    Review of Systems   Constitutional, HEENT, cardiovascular, pulmonary, gi and gu systems are negative, except as otherwise noted.      Objective    Pulse 83   Temp 97.3  F (36.3  C) (Temporal)   Resp 16   Ht 1.778 m (5' 10\")   Wt 100.7 kg (222 lb)   SpO2 95%   BMI 31.85 kg/m    Body mass index is 31.85 " kg/m .  Physical Exam   GENERAL: healthy, alert and no distress  EYES: Eyes grossly normal to inspection, PERRL and conjunctivae and sclerae normal  HENT: ear canals and TM's normal, nose and mouth without ulcers or lesions  NECK: no adenopathy, no asymmetry, masses, or scars and thyroid normal to palpation  RESP: lungs clear to auscultation - no rales, rhonchi or wheezes  CV: regular rate and rhythm, normal S1 S2, no S3 or S4, no murmur, click or rub, no peripheral edema and peripheral pulses strong  ABDOMEN: soft, nontender, no hepatosplenomegaly, no masses and bowel sounds normal  MS: no gross musculoskeletal defects noted, no edema  SKIN: no suspicious lesions or rashes  NEURO: Normal strength and tone, mentation intact and speech normal  PSYCH: mentation appears normal, affect normal/bright    Office Visit on 08/26/2020   Component Date Value Ref Range Status     Color Urine 08/26/2020 Yellow   Final     Appearance Urine 08/26/2020 Clear   Final     Glucose Urine 08/26/2020 Negative  NEG^Negative mg/dL Final     Bilirubin Urine 08/26/2020 Negative  NEG^Negative Final     Ketones Urine 08/26/2020 Negative  NEG^Negative mg/dL Final     Specific Gravity Urine 08/26/2020 1.025  1.003 - 1.035 Final     Blood Urine 08/26/2020 Negative  NEG^Negative Final     pH Urine 08/26/2020 5.5  5.0 - 7.0 pH Final     Protein Albumin Urine 08/26/2020 Negative  NEG^Negative mg/dL Final     Urobilinogen Urine 08/26/2020 1.0  0.2 - 1.0 EU/dL Final     Nitrite Urine 08/26/2020 Negative  NEG^Negative Final     Leukocyte Esterase Urine 08/26/2020 Negative  NEG^Negative Final     Source 08/26/2020 Midstream Urine   Final     WBC 08/26/2020 7.0  4.0 - 11.0 10e9/L Final     RBC Count 08/26/2020 5.61  4.4 - 5.9 10e12/L Final     Hemoglobin 08/26/2020 16.9  13.3 - 17.7 g/dL Final     Hematocrit 08/26/2020 49.3  40.0 - 53.0 % Final     MCV 08/26/2020 88  78 - 100 fl Final     MCH 08/26/2020 30.1  26.5 - 33.0 pg Final     MCHC 08/26/2020 34.3   31.5 - 36.5 g/dL Final     RDW 08/26/2020 13.9  10.0 - 15.0 % Final     Platelet Count 08/26/2020 197  150 - 450 10e9/L Final     Sodium 08/26/2020 140  133 - 144 mmol/L Final     Potassium 08/26/2020 4.0  3.4 - 5.3 mmol/L Final     Chloride 08/26/2020 108  94 - 109 mmol/L Final     Carbon Dioxide 08/26/2020 26  20 - 32 mmol/L Final     Anion Gap 08/26/2020 6  3 - 14 mmol/L Final     Glucose 08/26/2020 100* 70 - 99 mg/dL Final    Non Fasting     Urea Nitrogen 08/26/2020 21  7 - 30 mg/dL Final     Creatinine 08/26/2020 1.09  0.66 - 1.25 mg/dL Final     GFR Estimate 08/26/2020 64  >60 mL/min/[1.73_m2] Final    Comment: Non  GFR Calc  Starting 12/18/2018, serum creatinine based estimated GFR (eGFR) will be   calculated using the Chronic Kidney Disease Epidemiology Collaboration   (CKD-EPI) equation.       GFR Estimate If Black 08/26/2020 74  >60 mL/min/[1.73_m2] Final    Comment:  GFR Calc  Starting 12/18/2018, serum creatinine based estimated GFR (eGFR) will be   calculated using the Chronic Kidney Disease Epidemiology Collaboration   (CKD-EPI) equation.       Calcium 08/26/2020 9.4  8.5 - 10.1 mg/dL Final     Bilirubin Total 08/26/2020 1.1  0.2 - 1.3 mg/dL Final     Albumin 08/26/2020 3.7  3.4 - 5.0 g/dL Final     Protein Total 08/26/2020 7.2  6.8 - 8.8 g/dL Final     Alkaline Phosphatase 08/26/2020 84  40 - 150 U/L Final     ALT 08/26/2020 21  0 - 70 U/L Final     AST 08/26/2020 15  0 - 45 U/L Final     Cholesterol 08/26/2020 178  <200 mg/dL Final     Triglycerides 08/26/2020 144  <150 mg/dL Final    Non Fasting     HDL Cholesterol 08/26/2020 50  >39 mg/dL Final     LDL Cholesterol Calculated 08/26/2020 99  <100 mg/dL Final    Desirable:       <100 mg/dl     Non HDL Cholesterol 08/26/2020 128  <130 mg/dL Final     Vitamin D Deficiency screening 08/26/2020 50  20 - 75 ug/L Final    Comment: Season, race, dietary intake, and treatment affect the concentration of   25-hydroxy-Vitamin D.  Values may decrease during winter months and increase   during summer months. Values 20-29 ug/L may indicate Vitamin D insufficiency   and values <20 ug/L may indicate Vitamin D deficiency.  Vitamin D determination is routinely performed by an immunoassay specific for   25 hydroxyvitamin D3.  If an individual is on vitamin D2 (ergocalciferol)   supplementation, please specify 25 OH vitamin D2 and D3 level determination by   LCMSMS test [id number].       TSH 08/26/2020 2.14  0.40 - 4.00 mU/L Final     PSA 08/26/2020 4.68* 0 - 4 ug/L Final    Assay Method:  Chemiluminescence using Siemens Vista analyzer

## 2021-09-22 DIAGNOSIS — I10 ESSENTIAL HYPERTENSION: ICD-10-CM

## 2021-09-22 LAB
ALBUMIN SERPL-MCNC: 3.6 G/DL (ref 3.4–5)
ALP SERPL-CCNC: 89 U/L (ref 40–150)
ALT SERPL W P-5'-P-CCNC: 21 U/L (ref 0–70)
ANION GAP SERPL CALCULATED.3IONS-SCNC: 3 MMOL/L (ref 3–14)
AST SERPL W P-5'-P-CCNC: 17 U/L (ref 0–45)
BILIRUB SERPL-MCNC: 0.8 MG/DL (ref 0.2–1.3)
BUN SERPL-MCNC: 22 MG/DL (ref 7–30)
CALCIUM SERPL-MCNC: 8.6 MG/DL (ref 8.5–10.1)
CHLORIDE BLD-SCNC: 108 MMOL/L (ref 94–109)
CO2 SERPL-SCNC: 28 MMOL/L (ref 20–32)
CREAT SERPL-MCNC: 1.12 MG/DL (ref 0.66–1.25)
GFR SERPL CREATININE-BSD FRML MDRD: 62 ML/MIN/1.73M2
GLUCOSE BLD-MCNC: 87 MG/DL (ref 70–99)
POTASSIUM BLD-SCNC: 4.1 MMOL/L (ref 3.4–5.3)
PROT SERPL-MCNC: 7.1 G/DL (ref 6.8–8.8)
PSA SERPL-MCNC: 4.92 UG/L (ref 0–4)
SODIUM SERPL-SCNC: 139 MMOL/L (ref 133–144)
TSH SERPL DL<=0.005 MIU/L-ACNC: 2.21 MU/L (ref 0.4–4)

## 2021-09-23 DIAGNOSIS — I10 ESSENTIAL HYPERTENSION: ICD-10-CM

## 2021-09-23 RX ORDER — LISINOPRIL 20 MG/1
20 TABLET ORAL DAILY
Qty: 90 TABLET | Refills: 3 | Status: SHIPPED | OUTPATIENT
Start: 2021-09-23 | End: 2022-09-09

## 2021-09-24 RX ORDER — LISINOPRIL 20 MG/1
TABLET ORAL
Qty: 90 TABLET | Refills: 3 | OUTPATIENT
Start: 2021-09-24

## 2021-09-24 NOTE — TELEPHONE ENCOUNTER
This is a duplicate medication request. Mediation script last sent on 9/23/2021.     Isael Walker RN  [facility]

## 2021-10-31 ENCOUNTER — HEALTH MAINTENANCE LETTER (OUTPATIENT)
Age: 81
End: 2021-10-31

## 2022-09-09 ENCOUNTER — OFFICE VISIT (OUTPATIENT)
Dept: FAMILY MEDICINE | Facility: CLINIC | Age: 82
End: 2022-09-09
Payer: COMMERCIAL

## 2022-09-09 VITALS
RESPIRATION RATE: 16 BRPM | TEMPERATURE: 96.8 F | HEIGHT: 70 IN | WEIGHT: 225.8 LBS | SYSTOLIC BLOOD PRESSURE: 157 MMHG | DIASTOLIC BLOOD PRESSURE: 95 MMHG | OXYGEN SATURATION: 95 % | HEART RATE: 82 BPM | BODY MASS INDEX: 32.33 KG/M2

## 2022-09-09 DIAGNOSIS — Z12.5 ENCOUNTER FOR SCREENING FOR MALIGNANT NEOPLASM OF PROSTATE: ICD-10-CM

## 2022-09-09 DIAGNOSIS — I35.0 NONRHEUMATIC AORTIC VALVE STENOSIS: ICD-10-CM

## 2022-09-09 DIAGNOSIS — Z00.00 ENCOUNTER FOR PREVENTIVE CARE: Primary | ICD-10-CM

## 2022-09-09 DIAGNOSIS — I10 ESSENTIAL HYPERTENSION: ICD-10-CM

## 2022-09-09 LAB
BASOPHILS # BLD AUTO: 0 10E3/UL (ref 0–0.2)
BASOPHILS NFR BLD AUTO: 1 %
EOSINOPHIL # BLD AUTO: 0 10E3/UL (ref 0–0.7)
EOSINOPHIL NFR BLD AUTO: 0 %
ERYTHROCYTE [DISTWIDTH] IN BLOOD BY AUTOMATED COUNT: 12.8 % (ref 10–15)
HCT VFR BLD AUTO: 50.1 % (ref 40–53)
HGB BLD-MCNC: 16.7 G/DL (ref 13.3–17.7)
IMM GRANULOCYTES # BLD: 0 10E3/UL
IMM GRANULOCYTES NFR BLD: 0 %
LYMPHOCYTES # BLD AUTO: 1.2 10E3/UL (ref 0.8–5.3)
LYMPHOCYTES NFR BLD AUTO: 19 %
MCH RBC QN AUTO: 28.9 PG (ref 26.5–33)
MCHC RBC AUTO-ENTMCNC: 33.3 G/DL (ref 31.5–36.5)
MCV RBC AUTO: 87 FL (ref 78–100)
MONOCYTES # BLD AUTO: 0.6 10E3/UL (ref 0–1.3)
MONOCYTES NFR BLD AUTO: 10 %
NEUTROPHILS # BLD AUTO: 4.3 10E3/UL (ref 1.6–8.3)
NEUTROPHILS NFR BLD AUTO: 70 %
PLATELET # BLD AUTO: 209 10E3/UL (ref 150–450)
RBC # BLD AUTO: 5.78 10E6/UL (ref 4.4–5.9)
WBC # BLD AUTO: 6.1 10E3/UL (ref 4–11)

## 2022-09-09 PROCEDURE — 80053 COMPREHEN METABOLIC PANEL: CPT | Performed by: INTERNAL MEDICINE

## 2022-09-09 PROCEDURE — G0103 PSA SCREENING: HCPCS | Performed by: INTERNAL MEDICINE

## 2022-09-09 PROCEDURE — 80061 LIPID PANEL: CPT | Performed by: INTERNAL MEDICINE

## 2022-09-09 PROCEDURE — G0439 PPPS, SUBSEQ VISIT: HCPCS | Performed by: INTERNAL MEDICINE

## 2022-09-09 PROCEDURE — 84443 ASSAY THYROID STIM HORMONE: CPT | Performed by: INTERNAL MEDICINE

## 2022-09-09 PROCEDURE — 36415 COLL VENOUS BLD VENIPUNCTURE: CPT | Performed by: INTERNAL MEDICINE

## 2022-09-09 PROCEDURE — 85025 COMPLETE CBC W/AUTO DIFF WBC: CPT | Performed by: INTERNAL MEDICINE

## 2022-09-09 PROCEDURE — 99214 OFFICE O/P EST MOD 30 MIN: CPT | Mod: 25 | Performed by: INTERNAL MEDICINE

## 2022-09-09 RX ORDER — LISINOPRIL 30 MG/1
30 TABLET ORAL DAILY
Qty: 90 TABLET | Refills: 3 | Status: SHIPPED | OUTPATIENT
Start: 2022-09-09 | End: 2023-09-11

## 2022-09-09 ASSESSMENT — ACTIVITIES OF DAILY LIVING (ADL): CURRENT_FUNCTION: NO ASSISTANCE NEEDED

## 2022-09-09 ASSESSMENT — PAIN SCALES - GENERAL: PAINLEVEL: NO PAIN (0)

## 2022-09-09 NOTE — PATIENT INSTRUCTIONS
Nic;  Your blood pressure is a bit elevated.  Some of this is very likely anticipatory or situational in form.  Nevertheless, I would increase your lisinopril up to 30 mg daily.  If you develop lightheadedness we will know that 20 mg is a more appropriate dosage.    You do have an aortic murmur consistent with mild aortic stenosis.  I would recommend obtaining a baseline echocardiogram.  Further down the line this echocardiogram may be important regarding therapeutics.    Today we will obtain the usual lab tests.    Best regards  Nelson

## 2022-09-09 NOTE — PROGRESS NOTES
"SUBJECTIVE:   Bimal Lemos is a 82 year old male who presents for Preventive Visit.    \  Patient has been advised of split billing requirements and indicates understanding: Yes  Are you in the first 12 months of your Medicare coverage?  No    Healthy Habits:     In general, how would you rate your overall health?  Good    Frequency of exercise:: other     Duration of exercise:  Other    Do you usually eat at least 4 servings of fruit and vegetables a day, include whole grains    & fiber and avoid regularly eating high fat or \"junk\" foods?  No    Taking medications regularly:  Yes    Barriers to taking medications:  None    Medication side effects:  None    Ability to successfully perform activities of daily living:  No assistance needed    Home Safety:  No safety concerns identified    Hearing Impairment:  No hearing concerns    In the past 6 months, have you been bothered by leaking of urine?  No    In general, how would you rate your overall mental or emotional health?  Excellent      PHQ-2 Total Score: 0    Additional concerns today:  No    Do you feel safe in your environment? Yes    Have you ever done Advance Care Planning? (For example, a Health Directive, POLST, or a discussion with a medical provider or your loved ones about your wishes): No, advance care planning information given to patient to review.  Patient declined advance care planning discussion at this time.       Fall risk  Fallen 2 or more times in the past year?: No  Any fall with injury in the past year?: No    Cognitive Screening   1) Repeat 3 items (Leader, Season, Table)    2) Clock draw: NORMAL  3) 3 item recall: Recalls 3 objects  Results: 3 items recalled: COGNITIVE IMPAIRMENT LESS LIKELY    Mini-CogTM Copyright CHINO Bass. Licensed by the author for use in [facility]; reprinted with permission ([email or web address]). All rights reserved.      Do you have sleep apnea, excessive snoring or daytime drowsiness?: no    Reviewed and " "updated as needed this visit by clinical staff   Tobacco  Allergies  Meds                Reviewed and updated as needed this visit by Provider                   Social History     Tobacco Use     Smoking status: Never Smoker     Smokeless tobacco: Never Used   Substance Use Topics     Alcohol use: Yes     Alcohol/week: 0.0 standard drinks     Comment: rare     If you drink alcohol do you typically have >3 drinks per day or >7 drinks per week? No    Alcohol Use 8/26/2020   Prescreen: >3 drinks/day or >7 drinks/week? No               Current providers sharing in care for this patient include:   Patient Care Team:  Nelson Enriquez MD as PCP - General (Internal Medicine)  Nelson Enriquez MD as Assigned PCP    The following health maintenance items are reviewed in Epic and correct as of today:  Health Maintenance Due   Topic Date Due     ANNUAL REVIEW OF  ORDERS  Never done     ZOSTER IMMUNIZATION (1 of 2) Never done     DTAP/TDAP/TD IMMUNIZATION (3 - Td or Tdap) 10/10/2021     INFLUENZA VACCINE (1) 09/01/2022     Lab work is in process          Review of Systems  Constitutional, HEENT, cardiovascular, pulmonary, GI, , musculoskeletal, neuro, skin, endocrine and psych systems are negative, except as otherwise noted.    OBJECTIVE:   BP (!) 157/95 (BP Location: Left arm)   Pulse 82   Temp 96.8  F (36  C) (Temporal)   Resp 16   Ht 1.778 m (5' 10\")   Wt 102.4 kg (225 lb 12.8 oz)   SpO2 95%   BMI 32.40 kg/m   Estimated body mass index is 32.4 kg/m  as calculated from the following:    Height as of this encounter: 1.778 m (5' 10\").    Weight as of this encounter: 102.4 kg (225 lb 12.8 oz).  Physical Exam  GENERAL: healthy, alert and no distress  EYES: Eyes grossly normal to inspection, PERRL and conjunctivae and sclerae normal  HENT: ear canals and TM's normal, nose and mouth without ulcers or lesions  NECK: no adenopathy, no asymmetry, masses, or scars and thyroid normal to palpation  RESP: lungs clear to " "auscultation - no rales, rhonchi or wheezes  CV: regular rate and rhythm, normal S1 S2, no S3 or S4, no murmur, click or rub, no peripheral edema and peripheral pulses strong  ABDOMEN: soft, nontender, no hepatosplenomegaly, no masses and bowel sounds normal  MS: no gross musculoskeletal defects noted, no edema  SKIN: no suspicious lesions or rashes  NEURO: Normal strength and tone, mentation intact and speech normal  PSYCH: mentation appears normal, affect normal/bright    Diagnostic Test Results:  Labs reviewed in Epic    ASSESSMENT / PLAN:       ICD-10-CM    1. Encounter for preventive care  Z00.00 Comprehensive metabolic panel (BMP + Alb, Alk Phos, ALT, AST, Total. Bili, TP)     CBC with platelets and differential     TSH with free T4 reflex     Lipid panel reflex to direct LDL Fasting     PSA, screen     Comprehensive metabolic panel (BMP + Alb, Alk Phos, ALT, AST, Total. Bili, TP)     CBC with platelets and differential     TSH with free T4 reflex     Lipid panel reflex to direct LDL Fasting     PSA, screen   2. Essential hypertension  I10 lisinopril (ZESTRIL) 30 MG tablet   3. Encounter for screening for malignant neoplasm of prostate   Z12.5 PSA, screen     PSA, screen   4. Nonrheumatic aortic valve stenosis  I35.0 Echocardiogram Complete       Patient has a 4/6 systolic murmur at the right sternal border.  Suspect aortic stenosis.  We will evaluate gradient.  He is asymptomatic compliant with his lisinopril additionally.  Systolic pressures have been a bit elevated.  We will increase his lisinopril to 30 mg daily.    COUNSELING:  Reviewed preventive health counseling, as reflected in patient instructions    Estimated body mass index is 32.4 kg/m  as calculated from the following:    Height as of this encounter: 1.778 m (5' 10\").    Weight as of this encounter: 102.4 kg (225 lb 12.8 oz).        He reports that he has never smoked. He has never used smokeless tobacco.      Appropriate preventive services were " discussed with this patient, including applicable screening as appropriate for cardiovascular disease, diabetes, osteopenia/osteoporosis, and glaucoma.  As appropriate for age/gender, discussed screening for colorectal cancer, prostate cancer, breast cancer, and cervical cancer. Checklist reviewing preventive services available has been given to the patient.    Reviewed patients plan of care and provided an AVS. The Basic Care Plan (routine screening as documented in Health Maintenance) for Bimal meets the Care Plan requirement. This Care Plan has been established and reviewed with the Patient.    Counseling Resources:  ATP IV Guidelines  Pooled Cohorts Equation Calculator  Breast Cancer Risk Calculator  Breast Cancer: Medication to Reduce Risk  FRAX Risk Assessment  ICSI Preventive Guidelines  Dietary Guidelines for Americans, 2010  USDA's MyPlate  ASA Prophylaxis  Lung CA Screening    Nelson Enriquez MD  [facility]    Identified Health Risks:

## 2022-09-12 LAB
ALBUMIN SERPL-MCNC: 3.7 G/DL (ref 3.4–5)
ALP SERPL-CCNC: 70 U/L (ref 40–150)
ALT SERPL W P-5'-P-CCNC: 19 U/L (ref 0–70)
ANION GAP SERPL CALCULATED.3IONS-SCNC: 4 MMOL/L (ref 3–14)
AST SERPL W P-5'-P-CCNC: 15 U/L (ref 0–45)
BILIRUB SERPL-MCNC: 1.2 MG/DL (ref 0.2–1.3)
BUN SERPL-MCNC: 21 MG/DL (ref 7–30)
CALCIUM SERPL-MCNC: 9 MG/DL (ref 8.5–10.1)
CHLORIDE BLD-SCNC: 107 MMOL/L (ref 94–109)
CHOLEST SERPL-MCNC: 162 MG/DL
CO2 SERPL-SCNC: 27 MMOL/L (ref 20–32)
CREAT SERPL-MCNC: 1.13 MG/DL (ref 0.66–1.25)
FASTING STATUS PATIENT QL REPORTED: NO
GFR SERPL CREATININE-BSD FRML MDRD: 65 ML/MIN/1.73M2
GLUCOSE BLD-MCNC: 91 MG/DL (ref 70–99)
HDLC SERPL-MCNC: 45 MG/DL
LDLC SERPL CALC-MCNC: 95 MG/DL
NONHDLC SERPL-MCNC: 117 MG/DL
POTASSIUM BLD-SCNC: 3.9 MMOL/L (ref 3.4–5.3)
PROT SERPL-MCNC: 6.7 G/DL (ref 6.8–8.8)
PSA SERPL-MCNC: 3.64 UG/L (ref 0–4)
SODIUM SERPL-SCNC: 138 MMOL/L (ref 133–144)
TRIGL SERPL-MCNC: 111 MG/DL
TSH SERPL DL<=0.005 MIU/L-ACNC: 2.21 MU/L (ref 0.4–4)

## 2022-09-20 ENCOUNTER — HOSPITAL ENCOUNTER (OUTPATIENT)
Dept: CARDIOLOGY | Facility: CLINIC | Age: 82
Discharge: HOME | End: 2022-09-20
Attending: INTERNAL MEDICINE | Admitting: INTERNAL MEDICINE
Payer: COMMERCIAL

## 2022-09-20 DIAGNOSIS — I35.0 NONRHEUMATIC AORTIC VALVE STENOSIS: ICD-10-CM

## 2022-09-20 LAB — LVEF ECHO: NORMAL

## 2022-09-20 PROCEDURE — 93306 TTE W/DOPPLER COMPLETE: CPT | Mod: 26 | Performed by: INTERNAL MEDICINE

## 2022-09-20 PROCEDURE — 999N000208 ECHOCARDIOGRAM COMPLETE

## 2022-09-20 PROCEDURE — 255N000002 HC RX 255 OP 636: Performed by: INTERNAL MEDICINE

## 2022-09-20 RX ADMIN — HUMAN ALBUMIN MICROSPHERES AND PERFLUTREN 9 ML: 10; .22 INJECTION, SOLUTION INTRAVENOUS at 11:21

## 2022-10-23 ENCOUNTER — HEALTH MAINTENANCE LETTER (OUTPATIENT)
Age: 82
End: 2022-10-23

## 2023-03-09 NOTE — PROGRESS NOTES
"  Assessment & Plan     Essential hypertension  Lisinopril was increased from 20 to 30 mg last year.  The patient has no ill side effects and his blood pressure is at goal.  Continue present management return to clinic in 6 months.    Nonrheumatic aortic valve stenosis  Echo from last September is reviewed, mild aortic stenosis is noted.       BMI:   Estimated body mass index is 31.71 kg/m  as calculated from the following:    Height as of this encounter: 1.778 m (5' 10\").    Weight as of this encounter: 100.2 kg (221 lb).           Return in about 6 months (around 9/10/2023) for Routine preventive.    Darrell Campoverde MD  [facility] CHITRA Lucero is a 82 year old, presenting for the following health issues:  Establish Care and Hypertension (Follow up/)      HPI   Pt is new to me.  Was seeing Dr. Enriquez. Wife  5 years ago with Parkinson's.  He lives alone.  Has a son in [place].      HTN  The patient has a history of hypertension.  Blood pressure is noted as below.  Compliance with medication is noted.  Side effects of medication are not described.  Symptoms of uncontrolled hypertension including chest pain, dizziness, and vision changes have not been observed.    AORTIC STENOSIS:  Last TTE from 2022 reviewed.            Review of Systems         Objective    /80   Pulse 85   Temp 97.1  F (36.2  C) (Temporal)   Resp 20   Ht 1.778 m (5' 10\")   Wt 100.2 kg (221 lb)   SpO2 96%   BMI 31.71 kg/m    Body mass index is 31.71 kg/m .  Physical Exam   GENERAL: healthy, alert and no distress  NECK: no adenopathy, no asymmetry, masses, or scars and thyroid normal to palpation  RESP: lungs clear to auscultation - no rales, rhonchi or wheezes  CV: regular rates and rhythm, normal S1 S2, no S3 or S4, no murmur, click or rub, grade 2/6  murmur, peripheral pulses strong and no peripheral edema  ABDOMEN: soft, nontender, no hepatosplenomegaly, no masses and bowel sounds normal  MS: no " gross musculoskeletal defects noted, no edema

## 2023-03-10 ENCOUNTER — OFFICE VISIT (OUTPATIENT)
Dept: FAMILY MEDICINE | Facility: CLINIC | Age: 83
End: 2023-03-10
Payer: COMMERCIAL

## 2023-03-10 VITALS
BODY MASS INDEX: 31.64 KG/M2 | RESPIRATION RATE: 20 BRPM | OXYGEN SATURATION: 96 % | HEART RATE: 85 BPM | SYSTOLIC BLOOD PRESSURE: 125 MMHG | HEIGHT: 70 IN | TEMPERATURE: 97.1 F | DIASTOLIC BLOOD PRESSURE: 80 MMHG | WEIGHT: 221 LBS

## 2023-03-10 DIAGNOSIS — I35.0 NONRHEUMATIC AORTIC VALVE STENOSIS: ICD-10-CM

## 2023-03-10 DIAGNOSIS — I10 ESSENTIAL HYPERTENSION: Primary | ICD-10-CM

## 2023-03-10 PROCEDURE — 99213 OFFICE O/P EST LOW 20 MIN: CPT | Performed by: INTERNAL MEDICINE

## 2023-03-10 ASSESSMENT — PAIN SCALES - GENERAL: PAINLEVEL: NO PAIN (0)

## 2023-09-11 ENCOUNTER — OFFICE VISIT (OUTPATIENT)
Dept: FAMILY MEDICINE | Facility: CLINIC | Age: 83
End: 2023-09-11
Payer: COMMERCIAL

## 2023-09-11 VITALS
HEIGHT: 70 IN | RESPIRATION RATE: 18 BRPM | SYSTOLIC BLOOD PRESSURE: 136 MMHG | BODY MASS INDEX: 31.35 KG/M2 | HEART RATE: 84 BPM | DIASTOLIC BLOOD PRESSURE: 81 MMHG | WEIGHT: 219 LBS | OXYGEN SATURATION: 94 % | TEMPERATURE: 98.4 F

## 2023-09-11 DIAGNOSIS — I35.0 NONRHEUMATIC AORTIC VALVE STENOSIS: ICD-10-CM

## 2023-09-11 DIAGNOSIS — Z00.00 ENCOUNTER FOR MEDICARE ANNUAL WELLNESS EXAM: Primary | ICD-10-CM

## 2023-09-11 DIAGNOSIS — Z12.5 SCREENING FOR PROSTATE CANCER: ICD-10-CM

## 2023-09-11 DIAGNOSIS — I10 ESSENTIAL HYPERTENSION: ICD-10-CM

## 2023-09-11 LAB
ALBUMIN SERPL BCG-MCNC: 4.2 G/DL (ref 3.5–5.2)
ALP SERPL-CCNC: 77 U/L (ref 40–129)
ALT SERPL W P-5'-P-CCNC: 10 U/L (ref 0–70)
ANION GAP SERPL CALCULATED.3IONS-SCNC: 13 MMOL/L (ref 7–15)
AST SERPL W P-5'-P-CCNC: 19 U/L (ref 0–45)
BASOPHILS # BLD AUTO: 0 10E3/UL (ref 0–0.2)
BASOPHILS NFR BLD AUTO: 0 %
BILIRUB SERPL-MCNC: 1.1 MG/DL
BUN SERPL-MCNC: 20.7 MG/DL (ref 8–23)
CALCIUM SERPL-MCNC: 9.4 MG/DL (ref 8.8–10.2)
CHLORIDE SERPL-SCNC: 104 MMOL/L (ref 98–107)
CHOLEST SERPL-MCNC: 167 MG/DL
CREAT SERPL-MCNC: 1.28 MG/DL (ref 0.67–1.17)
DEPRECATED HCO3 PLAS-SCNC: 24 MMOL/L (ref 22–29)
EGFRCR SERPLBLD CKD-EPI 2021: 56 ML/MIN/1.73M2
EOSINOPHIL # BLD AUTO: 0 10E3/UL (ref 0–0.7)
EOSINOPHIL NFR BLD AUTO: 0 %
ERYTHROCYTE [DISTWIDTH] IN BLOOD BY AUTOMATED COUNT: 12.9 % (ref 10–15)
GLUCOSE SERPL-MCNC: 89 MG/DL (ref 70–99)
HCT VFR BLD AUTO: 51.3 % (ref 40–53)
HDLC SERPL-MCNC: 54 MG/DL
HGB BLD-MCNC: 16.7 G/DL (ref 13.3–17.7)
IMM GRANULOCYTES # BLD: 0 10E3/UL
IMM GRANULOCYTES NFR BLD: 0 %
LDLC SERPL CALC-MCNC: 100 MG/DL
LYMPHOCYTES # BLD AUTO: 1.3 10E3/UL (ref 0.8–5.3)
LYMPHOCYTES NFR BLD AUTO: 18 %
MCH RBC QN AUTO: 28.9 PG (ref 26.5–33)
MCHC RBC AUTO-ENTMCNC: 32.6 G/DL (ref 31.5–36.5)
MCV RBC AUTO: 89 FL (ref 78–100)
MONOCYTES # BLD AUTO: 0.7 10E3/UL (ref 0–1.3)
MONOCYTES NFR BLD AUTO: 10 %
NEUTROPHILS # BLD AUTO: 5.2 10E3/UL (ref 1.6–8.3)
NEUTROPHILS NFR BLD AUTO: 71 %
NONHDLC SERPL-MCNC: 113 MG/DL
PLATELET # BLD AUTO: 214 10E3/UL (ref 150–450)
POTASSIUM SERPL-SCNC: 4.1 MMOL/L (ref 3.4–5.3)
PROT SERPL-MCNC: 7 G/DL (ref 6.4–8.3)
PSA SERPL DL<=0.01 NG/ML-MCNC: 3.49 NG/ML
RBC # BLD AUTO: 5.77 10E6/UL (ref 4.4–5.9)
SODIUM SERPL-SCNC: 141 MMOL/L (ref 136–145)
TRIGL SERPL-MCNC: 66 MG/DL
TSH SERPL DL<=0.005 MIU/L-ACNC: 2.12 UIU/ML (ref 0.3–4.2)
WBC # BLD AUTO: 7.2 10E3/UL (ref 4–11)

## 2023-09-11 PROCEDURE — G0103 PSA SCREENING: HCPCS | Performed by: INTERNAL MEDICINE

## 2023-09-11 PROCEDURE — 85025 COMPLETE CBC W/AUTO DIFF WBC: CPT | Performed by: INTERNAL MEDICINE

## 2023-09-11 PROCEDURE — 80053 COMPREHEN METABOLIC PANEL: CPT | Performed by: INTERNAL MEDICINE

## 2023-09-11 PROCEDURE — 84443 ASSAY THYROID STIM HORMONE: CPT | Performed by: INTERNAL MEDICINE

## 2023-09-11 PROCEDURE — 36415 COLL VENOUS BLD VENIPUNCTURE: CPT | Performed by: INTERNAL MEDICINE

## 2023-09-11 PROCEDURE — 80061 LIPID PANEL: CPT | Performed by: INTERNAL MEDICINE

## 2023-09-11 PROCEDURE — G0439 PPPS, SUBSEQ VISIT: HCPCS | Performed by: INTERNAL MEDICINE

## 2023-09-11 RX ORDER — LISINOPRIL 30 MG/1
30 TABLET ORAL DAILY
Qty: 90 TABLET | Refills: 3 | Status: SHIPPED | OUTPATIENT
Start: 2023-09-11 | End: 2024-09-24

## 2023-09-11 ASSESSMENT — ENCOUNTER SYMPTOMS
CONSTIPATION: 0
DIZZINESS: 0
HEMATURIA: 0
CHILLS: 0
NERVOUS/ANXIOUS: 0
DIARRHEA: 0
COUGH: 0
FEVER: 0
HEMATOCHEZIA: 0
ABDOMINAL PAIN: 0
EYE PAIN: 0

## 2023-09-11 ASSESSMENT — ACTIVITIES OF DAILY LIVING (ADL): CURRENT_FUNCTION: NO ASSISTANCE NEEDED

## 2023-09-11 ASSESSMENT — PAIN SCALES - GENERAL: PAINLEVEL: NO PAIN (0)

## 2023-09-11 NOTE — PROGRESS NOTES
"SUBJECTIVE:   Nic is a 83 year old who presents for Preventive Visit.      Are you in the first 12 months of your Medicare coverage?      Healthy Habits:     In general, how would you rate your overall health?  Excellent    Frequency of exercise:  1 day/week    Duration of exercise:  Less than 15 minutes    Do you usually eat at least 4 servings of fruit and vegetables a day, include whole grains    & fiber and avoid regularly eating high fat or \"junk\" foods?  No    Taking medications regularly:  Yes    Barriers to taking medications:  None    Medication side effects:  None    Ability to successfully perform activities of daily living:  No assistance needed    Home Safety:  No safety concerns identified    Hearing Impairment:  No hearing concerns    In the past 6 months, have you been bothered by leaking of urine?  No    In general, how would you rate your overall mental or emotional health?  Excellent    Additional concerns today:  No        Have you ever done Advance Care Planning? (For example, a Health Directive, POLST, or a discussion with a medical provider or your loved ones about your wishes): No, advance care planning information given to patient to review.  Patient plans to discuss their wishes with loved ones or provider.         Fall risk  Fallen 2 or more times in the past year?: No  Any fall with injury in the past year?: No    Cognitive Screening   1) Repeat 3 items (Leader, Season, Table)    2) Clock draw: NORMAL  3) 3 item recall: Recalls NO objects   Results: All three items recalled    Mini-CogTM Copyright S Kali. Licensed by the author for use in [facility]; reprinted with permission ([email or web address]). All rights reserved.      Do you have sleep apnea, excessive snoring or daytime drowsiness? : no    Reviewed and updated as needed this visit by clinical staff   Tobacco  Allergies  Meds      Soc Hx        Reviewed and updated as needed this visit by Provider                 Social " History     Tobacco Use    Smoking status: Never    Smokeless tobacco: Never   Substance Use Topics    Alcohol use: Yes     Alcohol/week: 0.0 standard drinks of alcohol     Comment: rare             9/11/2023    11:19 AM   Alcohol Use   Prescreen: >3 drinks/day or >7 drinks/week? No          No data to display              Do you have a current opioid prescription? No  Do you use any other controlled substances or medications that are not prescribed by a provider? None              Current providers sharing in care for this patient include:   Patient Care Team:  Darrell Campoverde MD as PCP - General (Internal Medicine)  Darrell Campoverde MD as Assigned PCP    The following health maintenance items are reviewed in Epic and correct as of today:  Health Maintenance   Topic Date Due    ANNUAL REVIEW OF HM ORDERS  Never done    ZOSTER IMMUNIZATION (1 of 2) Never done    DTAP/TDAP/TD IMMUNIZATION (3 - Td or Tdap) 10/10/2021    COVID-19 Vaccine (6 - Pfizer series) 02/11/2023    INFLUENZA VACCINE (1) 09/01/2023    MEDICARE ANNUAL WELLNESS VISIT  09/11/2024    FALL RISK ASSESSMENT  09/11/2024    ADVANCE CARE PLANNING  09/11/2028    PHQ-2 (once per calendar year)  Completed    Pneumococcal Vaccine: 65+ Years  Completed    IPV IMMUNIZATION  Aged Out    HPV IMMUNIZATION  Aged Out    MENINGITIS IMMUNIZATION  Aged Out               Review of Systems   Constitutional:  Negative for chills and fever.   HENT:  Negative for congestion and ear pain.    Eyes:  Negative for pain.   Respiratory:  Negative for cough.    Cardiovascular:  Negative for chest pain.   Gastrointestinal:  Negative for abdominal pain, constipation, diarrhea and hematochezia.   Genitourinary:  Negative for hematuria.   Neurological:  Negative for dizziness.   Psychiatric/Behavioral:  The patient is not nervous/anxious.          OBJECTIVE:   /81 (BP Location: Left arm, Patient Position: Chair, Cuff Size: Adult Large)   Pulse 84   Temp 98.4  F (36.9  C)  "(Temporal)   Resp 18   Ht 1.778 m (5' 10\")   Wt 99.3 kg (219 lb)   SpO2 94%   BMI 31.42 kg/m   Estimated body mass index is 31.42 kg/m  as calculated from the following:    Height as of this encounter: 1.778 m (5' 10\").    Weight as of this encounter: 99.3 kg (219 lb).  Physical Exam          ASSESSMENT / PLAN:       ICD-10-CM    1. Encounter for Medicare annual wellness exam  Z00.00       2. Essential hypertension  I10 CBC with Platelets & Differential     Comprehensive metabolic panel     TSH with free T4 reflex     Lipid panel reflex to direct LDL Fasting      3. Nonrheumatic aortic valve stenosis  I35.0 CBC with Platelets & Differential     Comprehensive metabolic panel     TSH with free T4 reflex     Lipid panel reflex to direct LDL Fasting      4. Screening for prostate cancer  Z12.5 PSA, screen                COUNSELING:  Reviewed preventive health counseling, as reflected in patient instructions      BMI:   Estimated body mass index is 31.42 kg/m  as calculated from the following:    Height as of this encounter: 1.778 m (5' 10\").    Weight as of this encounter: 99.3 kg (219 lb).         He reports that he has never smoked. He has never used smokeless tobacco.      Appropriate preventive services were discussed with this patient, including applicable screening as appropriate for cardiovascular disease, diabetes, osteopenia/osteoporosis, and glaucoma.  As appropriate for age/gender, discussed screening for colorectal cancer, prostate cancer, breast cancer, and cervical cancer. Checklist reviewing preventive services available has been given to the patient.    Reviewed patients plan of care and provided an AVS. The Basic Care Plan (routine screening as documented in Health Maintenance) for Bimal meets the Care Plan requirement. This Care Plan has been established and reviewed with the Patient.          Darrell Campoverde MD  [facility]    Identified Health Risks:  I have reviewed Opioid " Use Disorder and Substance Use Disorder risk factors and made any needed referrals.

## 2023-09-11 NOTE — PATIENT INSTRUCTIONS
"LABS today      Shingrix :  1) Recommended for healthy adults aged 50 years and older to help prevent shingles and its related complications such as pain related to the shingles virus  2) Recommended for adults who previously received the previous shingles vaccine (Zostavax )  3) The preferred vaccine for helping to prevent shingles and its related complications  4) It is a series of TWO vaccines with the second dose administered between 2-6 months after the first dose in this series  5) PLEASE CHECK COST WITH YOUR INSURANCE COMPANY OR YOUR LOCAL PHARMACY AS EACH DOSE MAY BE AS MUCH AS APPROXIMATELY $300 IF NOT COVERED BY INSURANCE     Patient Education   Personalized Prevention Plan  You are due for the preventive services outlined below.  Your care team is available to assist you in scheduling these services.  If you have already completed any of these items, please share that information with your care team to update in your medical record.  Health Maintenance Due   Topic Date Due    ANNUAL REVIEW OF HM ORDERS  Never done    Zoster (Shingles) Vaccine (1 of 2) Never done    Diptheria Tetanus Pertussis (DTAP/TDAP/TD) Vaccine (3 - Td or Tdap) 10/10/2021    COVID-19 Vaccine (6 - Pfizer series) 02/11/2023    Flu Vaccine (1) 09/01/2023     Learning About Being Physically Active  What is physical activity?     Being physically active means doing any kind of activity that gets your body moving.  The types of physical activity that can help you get fit and stay healthy include:  Aerobic or \"cardio\" activities. These make your heart beat faster and make you breathe harder, such as brisk walking, riding a bike, or running. They strengthen your heart and lungs and build up your endurance.  Strength training activities. These make your muscles work against, or \"resist,\" something. Examples include lifting weights or doing push-ups. These activities help tone and strengthen your muscles and bones.  Stretches. These let you " move your joints and muscles through their full range of motion. Stretching helps you be more flexible.  Reaching a balance between these three types of physical activity is important because each one contributes to your overall fitness.  What are the benefits of being active?  Being active is one of the best things you can do for your health. It helps you to:  Feel stronger and have more energy to do all the things you like to do.  Focus better at school or work.  Feel, think, and sleep better.  Reach and stay at a healthy weight.  Lose fat and build lean muscle.  Lower your risk for serious health problems, including diabetes, heart attack, high blood pressure, and some cancers.  Keep your heart, lungs, bones, muscles, and joints strong and healthy.  How can you make being active part of your life?  Start slowly. Make it your long-term goal to get at least 30 minutes of exercise on most days of the week. Walking is a good choice. You also may want to do other activities, such as running, swimming, cycling, or playing tennis or team sports.  Pick activities that you like--ones that make your heart beat faster, your muscles stronger, and your muscles and joints more flexible. If you find more than one thing you like doing, do them all. You don't have to do the same thing every day.  Get your heart pumping every day. Any activity that makes your heart beat faster and keeps it at that rate for a while counts.  Here are some great ways to get your heart beating faster:  Go for a brisk walk, run, or bike ride.  Go for a hike or swim.  Go in-line skating.  Play a game of touch football, basketball, or soccer.  Ride a bike.  Play tennis or racquetball.  Climb stairs.  Even some household chores can be aerobic--just do them at a faster pace. Vacuuming, raking or mowing the lawn, sweeping the garage, and washing and waxing the car all can help get your heart rate up.  Strengthen your muscles during the week. You don't have  "to lift heavy weights or grow big, bulky muscles to get stronger. Doing a few simple activities that make your muscles work against, or \"resist,\" something can help you get stronger.  For example, you can:  Do push-ups or sit-ups, which use your own body weight as resistance.  Lift weights or dumbbells or use stretch bands at home or in a gym or community center.  Stretch your muscles often. Stretching will help you as you become more active. It can help you stay flexible, loosen tight muscles, and avoid injury. It can also help improve your balance and posture and can be a great way to relax.  Be sure to stretch the muscles you'll be using when you work out. It's best to warm your muscles slightly before you stretch them. Walk or do some other light aerobic activity for a few minutes, and then start stretching.  When you stretch your muscles:  Do it slowly. Stretching is not about going fast or making sudden movements.  Don't push or bounce during a stretch.  Hold each stretch for at least 15 to 30 seconds, if you can. You should feel a stretch in the muscle, but not pain.  Breathe out as you do the stretch. Then breathe in as you hold the stretch. Don't hold your breath.  If you're worried about how more activity might affect your health, have a checkup before you start. Follow any special advice your doctor gives you for getting a smart start.  Where can you learn more?  Go to https://www.[employer].net/patiented  Enter W332 in the search box to learn more about \"Learning About Being Physically Active.\"  Current as of: October 10, 2022               Content Version: 13.7    1700-3995 [employer].   Care instructions adapted under license by your healthcare professional. If you have questions about a medical condition or this instruction, always ask your healthcare professional. [employer] disclaims any warranty or liability for your use of this information.      Learning About Dietary " Guidelines  What are the Dietary Guidelines for Americans?     Dietary Guidelines for Americans provide tips for eating well and staying healthy. This helps reduce the risk for long-term (chronic) diseases.  These guidelines recommend that you:  Eat and drink the right amount for you. The U.S. government's food guide is called MyPlate. It can help you make your own well-balanced eating plan.  Try to balance your eating with your activity. This helps you stay at a healthy weight.  Drink alcohol in moderation, if at all.  Limit foods high in salt, saturated fat, trans fat, and added sugar.  These guidelines are from the U.S. Department of Agriculture and the U.S. Department of Health and Human Services. They are updated every 5 years.  What is MyPlate?  MyPlate is the U.S. government's food guide. It can help you make your own well-balanced eating plan. A balanced eating plan means that you eat enough, but not too much, and that your food gives you the nutrients you need to stay healthy.  MyPlate focuses on eating plenty of whole grains, fruits, and vegetables, and on limiting fat and sugar. It is available online at www.ChooseMyPlate.gov.  How can you get started?  If you're trying to eat healthier, you can slowly change your eating habits over time. You don't have to make big changes all at once. Start by adding one or two healthy foods to your meals each day.  Grains  Choose whole-grain breads and cereals and whole-wheat pasta and whole-grain crackers.  Vegetables  Eat a variety of vegetables every day. They have lots of nutrients and are part of a heart-healthy diet.  Fruits  Eat a variety of fruits every day. Fruits contain lots of nutrients. Choose fresh fruit instead of fruit juice.  Protein foods  Choose fish and lean poultry more often. Eat red meat and fried meats less often. Dried beans, tofu, and nuts are also good sources of protein.  Dairy  Choose low-fat or fat-free products from this food group. If you  "have problems digesting milk, try eating cheese or yogurt instead.  Fats and oils  Limit fats and oils if you're trying to cut calories. Choose healthy fats when you cook. These include canola oil and olive oil.  Where can you learn more?  Go to https://www.[employer].net/patiented  Enter D676 in the search box to learn more about \"Learning About Dietary Guidelines.\"  Current as of: March 1, 2023               Content Version: 13.7    5454-3797 [employer].   Care instructions adapted under license by your healthcare professional. If you have questions about a medical condition or this instruction, always ask your healthcare professional. [employer] disclaims any warranty or liability for your use of this information.         "

## 2024-01-28 ENCOUNTER — HOSPITAL ENCOUNTER (EMERGENCY)
Facility: CLINIC | Age: 84
Discharge: HOME | End: 2024-01-28
Attending: EMERGENCY MEDICINE | Admitting: EMERGENCY MEDICINE
Payer: COMMERCIAL

## 2024-01-28 VITALS
SYSTOLIC BLOOD PRESSURE: 160 MMHG | DIASTOLIC BLOOD PRESSURE: 89 MMHG | OXYGEN SATURATION: 95 % | HEART RATE: 90 BPM | RESPIRATION RATE: 20 BRPM | TEMPERATURE: 98.4 F

## 2024-01-28 DIAGNOSIS — R33.9 URINARY RETENTION: ICD-10-CM

## 2024-01-28 DIAGNOSIS — I10 HYPERTENSION, UNSPECIFIED TYPE: ICD-10-CM

## 2024-01-28 LAB
ALBUMIN UR-MCNC: 30 MG/DL
ANION GAP SERPL CALCULATED.3IONS-SCNC: 16 MMOL/L (ref 7–15)
APPEARANCE UR: CLEAR
BASOPHILS # BLD AUTO: 0.1 10E3/UL (ref 0–0.2)
BASOPHILS NFR BLD AUTO: 1 %
BILIRUB UR QL STRIP: NEGATIVE
BUN SERPL-MCNC: 27.6 MG/DL (ref 8–23)
CALCIUM SERPL-MCNC: 9.6 MG/DL (ref 8.8–10.2)
CHLORIDE SERPL-SCNC: 104 MMOL/L (ref 98–107)
COLOR UR AUTO: YELLOW
CREAT SERPL-MCNC: 1.3 MG/DL (ref 0.67–1.17)
DEPRECATED HCO3 PLAS-SCNC: 20 MMOL/L (ref 22–29)
EGFRCR SERPLBLD CKD-EPI 2021: 55 ML/MIN/1.73M2
EOSINOPHIL # BLD AUTO: 0 10E3/UL (ref 0–0.7)
EOSINOPHIL NFR BLD AUTO: 0 %
ERYTHROCYTE [DISTWIDTH] IN BLOOD BY AUTOMATED COUNT: 12.8 % (ref 10–15)
GLUCOSE SERPL-MCNC: 132 MG/DL (ref 70–99)
GLUCOSE UR STRIP-MCNC: NEGATIVE MG/DL
HCT VFR BLD AUTO: 49.7 % (ref 40–53)
HGB BLD-MCNC: 17 G/DL (ref 13.3–17.7)
HGB UR QL STRIP: ABNORMAL
IMM GRANULOCYTES # BLD: 0.1 10E3/UL
IMM GRANULOCYTES NFR BLD: 1 %
KETONES UR STRIP-MCNC: NEGATIVE MG/DL
LEUKOCYTE ESTERASE UR QL STRIP: NEGATIVE
LYMPHOCYTES # BLD AUTO: 0.8 10E3/UL (ref 0.8–5.3)
LYMPHOCYTES NFR BLD AUTO: 8 %
MCH RBC QN AUTO: 29.3 PG (ref 26.5–33)
MCHC RBC AUTO-ENTMCNC: 34.2 G/DL (ref 31.5–36.5)
MCV RBC AUTO: 86 FL (ref 78–100)
MONOCYTES # BLD AUTO: 0.8 10E3/UL (ref 0–1.3)
MONOCYTES NFR BLD AUTO: 8 %
MUCOUS THREADS #/AREA URNS LPF: PRESENT /LPF
NEUTROPHILS # BLD AUTO: 8.7 10E3/UL (ref 1.6–8.3)
NEUTROPHILS NFR BLD AUTO: 82 %
NITRATE UR QL: NEGATIVE
NRBC # BLD AUTO: 0 10E3/UL
NRBC BLD AUTO-RTO: 0 /100
PH UR STRIP: 5.5 [PH] (ref 5–7)
PLATELET # BLD AUTO: 230 10E3/UL (ref 150–450)
POTASSIUM SERPL-SCNC: 3.4 MMOL/L (ref 3.4–5.3)
RBC # BLD AUTO: 5.81 10E6/UL (ref 4.4–5.9)
RBC URINE: 18 /HPF
SODIUM SERPL-SCNC: 140 MMOL/L (ref 135–145)
SP GR UR STRIP: 1.02 (ref 1–1.03)
UROBILINOGEN UR STRIP-MCNC: NORMAL MG/DL
WBC # BLD AUTO: 10.6 10E3/UL (ref 4–11)
WBC URINE: 1 /HPF

## 2024-01-28 PROCEDURE — 99283 EMERGENCY DEPT VISIT LOW MDM: CPT

## 2024-01-28 PROCEDURE — 81001 URINALYSIS AUTO W/SCOPE: CPT | Performed by: EMERGENCY MEDICINE

## 2024-01-28 PROCEDURE — 80048 BASIC METABOLIC PNL TOTAL CA: CPT | Performed by: EMERGENCY MEDICINE

## 2024-01-28 PROCEDURE — 85025 COMPLETE CBC W/AUTO DIFF WBC: CPT | Performed by: EMERGENCY MEDICINE

## 2024-01-28 PROCEDURE — 36415 COLL VENOUS BLD VENIPUNCTURE: CPT | Performed by: EMERGENCY MEDICINE

## 2024-01-28 PROCEDURE — 51798 US URINE CAPACITY MEASURE: CPT

## 2024-01-28 PROCEDURE — 51702 INSERT TEMP BLADDER CATH: CPT

## 2024-01-28 RX ORDER — TAMSULOSIN HYDROCHLORIDE 0.4 MG/1
0.4 CAPSULE ORAL DAILY
Qty: 10 CAPSULE | Refills: 0 | Status: SHIPPED | OUTPATIENT
Start: 2024-01-28 | End: 2024-02-07

## 2024-01-28 ASSESSMENT — ACTIVITIES OF DAILY LIVING (ADL): ADLS_ACUITY_SCORE: 33

## 2024-01-28 NOTE — ED TRIAGE NOTES
Pt reports urinary retention since this afternoon - reports multiple episodes of diarrhea since 1600. Pt feels urgency to urinate, but unable to.      Triage Assessment (Adult)       Row Name 01/28/24 0220          Respiratory WDL    Respiratory WDL WDL        Cardiac WDL    Cardiac WDL WDL        Cognitive/Neuro/Behavioral WDL    Cognitive/Neuro/Behavioral WDL WDL

## 2024-01-28 NOTE — DISCHARGE INSTRUCTIONS
Start the Flomax while you have the catheter in place  Call urology clinic on Monday to get seen as soon as possible.  You will likely have this catheter in for 1 to 2 weeks  Have your blood pressure rechecked as it was elevated today

## 2024-01-28 NOTE — ED PROVIDER NOTES
History     Chief Complaint:  Urinary Retention       HPI   Bimal Lemos is a 83 year old male presented to the emergency department with urinary retention.  Patient has not been able to urinate since about 5 PM this evening.  Earlier in the day, he was urinating normally.  He has no dysuria.  No hematuria.  He did have some diarrhea for a few hours which has now stopped.  No vomiting.  No fever.    Independent Historian:   None - Patient Only    Medications:    Cholecalciferol (VITAMIN D3 PO)  lisinopril (ZESTRIL) 30 MG tablet      Past Medical History:    Past Medical History:   Diagnosis Date    Hypertension      Past Surgical History:    Past Surgical History:   Procedure Laterality Date    CATARACT IOL, RT/LT          Physical Exam   Patient Vitals for the past 24 hrs:   BP Temp Temp src Pulse Resp SpO2   01/28/24 0401 (!) 167/119 -- -- -- -- --   01/28/24 0219 (!) 212/117 97.6  F (36.4  C) Oral 116 18 95 %        Physical Exam  General: Standing up beside the bed, appears uncomfortable  Eyes:  The pupils are equal and round    Conjunctivae and sclerae are normal  ENT:    Atraumatic face  Neck:  Normal range of motion  CV:  Regular rate    Skin warm and well perfused   Resp:  Non labored breathing on room air    No tachypnea    No cough heard  GI:  Abdomen is soft, there is no rigidity    No distension    No rebound tenderness     No abdominal tenderness  MS:  Normal muscular tone  Skin:  No rash or acute skin lesions noted  Neuro:   Awake, alert.      Speech is normal and fluent.    Face is symmetric.     Moves all extremities equally  Psych: Normal affect.  Appropriate interactions.    Emergency Department Course       Laboratory:  Labs Ordered and Resulted from Time of ED Arrival to Time of ED Departure   ROUTINE UA WITH MICROSCOPIC REFLEX TO CULTURE - Abnormal       Result Value    Color Urine Yellow      Appearance Urine Clear      Glucose Urine Negative      Bilirubin Urine Negative      Ketones  Urine Negative      Specific Gravity Urine 1.022      Blood Urine Small (*)     pH Urine 5.5      Protein Albumin Urine 30 (*)     Urobilinogen Urine Normal      Nitrite Urine Negative      Leukocyte Esterase Urine Negative      Mucus Urine Present (*)     RBC Urine 18 (*)     WBC Urine 1     BASIC METABOLIC PANEL - Abnormal    Sodium 140      Potassium 3.4      Chloride 104      Carbon Dioxide (CO2) 20 (*)     Anion Gap 16 (*)     Urea Nitrogen 27.6 (*)     Creatinine 1.30 (*)     GFR Estimate 55 (*)     Calcium 9.6      Glucose 132 (*)    CBC WITH PLATELETS AND DIFFERENTIAL - Abnormal    WBC Count 10.6      RBC Count 5.81      Hemoglobin 17.0      Hematocrit 49.7      MCV 86      MCH 29.3      MCHC 34.2      RDW 12.8      Platelet Count 230      % Neutrophils 82      % Lymphocytes 8      % Monocytes 8      % Eosinophils 0      % Basophils 1      % Immature Granulocytes 1      NRBCs per 100 WBC 0      Absolute Neutrophils 8.7 (*)     Absolute Lymphocytes 0.8      Absolute Monocytes 0.8      Absolute Eosinophils 0.0      Absolute Basophils 0.1      Absolute Immature Granulocytes 0.1      Absolute NRBCs 0.0        Emergency Department Course & Assessments:    Assessments:  Evaluated by myself  Patient reevaluated and feels back to normal after the Joseph catheter was placed. Appears much more comfortable    Disposition:  The patient was discharged.     Impression & Plan      Medical Decision Making:  Bimal Lemos is a 83 year old male who presents for evaluation of abdominal pain and decreased urinary output.  I considered a broad differential including diverticulitis, aneurysm, urinary retention, ureterolithiasis, UTI, pyelonephritis, neurologic causes (MS, cauda equina,etc), colitis, etc.  The history and exam are consistent with acute urinary retention and this is confirmed after joseph catheter placement.  A urinalysis was obtained and no evidence of infection.  Has a few red blood cells which is likely from  Fisher catheter placement.  I doubt ureteral stone.  After Fisher was placed, he feels back to normal and has no pain.  No abdominal tenderness on exam.  Creatinine is stable compared to baseline.  Unclear exactly why he had diarrhea earlier in the day though no abdominal tenderness right now and it has seemed to stop so do not think further workup is indicated for this.  Discussed that he needs to see urology for Fisher catheter removal.  Will start on Flomax.  His blood pressure was mildly elevated though improved on recheck after the Fisher catheter was placed.  He will recheck the blood pressure at home and follow-up with primary care provider if continues to be elevated.    Diagnosis:    ICD-10-CM    1. Urinary retention  R33.9       2. Hypertension, unspecified type  I10          Discharge Medications:  New Prescriptions    TAMSULOSIN (FLOMAX) 0.4 MG CAPSULE    Take 1 capsule (0.4 mg) by mouth daily for 10 doses      1/28/2024   Halle Grimaldo MD Goertz, Maria Kristine, MD  01/28/24 0416

## 2024-03-11 ENCOUNTER — OFFICE VISIT (OUTPATIENT)
Dept: FAMILY MEDICINE | Facility: CLINIC | Age: 84
End: 2024-03-11
Payer: COMMERCIAL

## 2024-03-11 VITALS
DIASTOLIC BLOOD PRESSURE: 70 MMHG | WEIGHT: 212 LBS | BODY MASS INDEX: 30.35 KG/M2 | RESPIRATION RATE: 16 BRPM | SYSTOLIC BLOOD PRESSURE: 118 MMHG | TEMPERATURE: 98.7 F | OXYGEN SATURATION: 97 % | HEART RATE: 100 BPM | HEIGHT: 70 IN

## 2024-03-11 DIAGNOSIS — I10 ESSENTIAL HYPERTENSION: ICD-10-CM

## 2024-03-11 DIAGNOSIS — N18.31 CHRONIC KIDNEY DISEASE, STAGE 3A (H): ICD-10-CM

## 2024-03-11 DIAGNOSIS — R33.9 URINARY RETENTION: Primary | ICD-10-CM

## 2024-03-11 LAB
ANION GAP SERPL CALCULATED.3IONS-SCNC: 7 MMOL/L (ref 7–15)
BUN SERPL-MCNC: 21.9 MG/DL (ref 8–23)
CALCIUM SERPL-MCNC: 9.2 MG/DL (ref 8.8–10.2)
CHLORIDE SERPL-SCNC: 105 MMOL/L (ref 98–107)
CREAT SERPL-MCNC: 1.24 MG/DL (ref 0.67–1.17)
CREAT UR-MCNC: 162 MG/DL
DEPRECATED HCO3 PLAS-SCNC: 29 MMOL/L (ref 22–29)
EGFRCR SERPLBLD CKD-EPI 2021: 58 ML/MIN/1.73M2
GLUCOSE SERPL-MCNC: 114 MG/DL (ref 70–99)
MICROALBUMIN UR-MCNC: 248 MG/L
MICROALBUMIN/CREAT UR: 153.09 MG/G CR (ref 0–17)
POTASSIUM SERPL-SCNC: 4.1 MMOL/L (ref 3.4–5.3)
SODIUM SERPL-SCNC: 141 MMOL/L (ref 135–145)

## 2024-03-11 PROCEDURE — 82043 UR ALBUMIN QUANTITATIVE: CPT | Performed by: INTERNAL MEDICINE

## 2024-03-11 PROCEDURE — 80048 BASIC METABOLIC PNL TOTAL CA: CPT | Performed by: INTERNAL MEDICINE

## 2024-03-11 PROCEDURE — 82570 ASSAY OF URINE CREATININE: CPT | Performed by: INTERNAL MEDICINE

## 2024-03-11 PROCEDURE — 99213 OFFICE O/P EST LOW 20 MIN: CPT | Performed by: INTERNAL MEDICINE

## 2024-03-11 PROCEDURE — 36415 COLL VENOUS BLD VENIPUNCTURE: CPT | Performed by: INTERNAL MEDICINE

## 2024-03-11 RX ORDER — TAMSULOSIN HYDROCHLORIDE 0.4 MG/1
0.4 CAPSULE ORAL DAILY
COMMUNITY
Start: 2024-02-29

## 2024-03-11 ASSESSMENT — PAIN SCALES - GENERAL: PAINLEVEL: NO PAIN (0)

## 2024-03-11 NOTE — PROGRESS NOTES
"  Assessment & Plan     Urinary retention  Improved.  Remains on Flomax.  Followed by [facility].      Chronic kidney disease, stage 3a (H)  By labs. Labs today.  Avoid nephrotoxins.    - Basic metabolic panel  (Ca, Cl, CO2, Creat, Gluc, K, Na, BUN)  - Albumin Random Urine Quantitative with Creat Ratio    Essential hypertension  Stable on current regimen. Will assess GFR and determine need for changes.          BMI  Estimated body mass index is 30.42 kg/m  as calculated from the following:    Height as of this encounter: 1.778 m (5' 10\").    Weight as of this encounter: 96.2 kg (212 lb).             Red Lucero is a 83 year old, presenting for the following health issues:  Follow Up (6 month followup and had cath removed )        3/11/2024    11:14 AM   Additional Questions   Roomed by Zakia         3/11/2024    11:14 AM   Patient Reported Additional Medications   Patient reports taking the following new medications Tamsulosin     HPI       Was in the emergency room at the end of January.  He presented with urinary retention (known BPH).  His blood pressure was also noted to be high in the emergency room.    Patient had a Fisher placed.  No blood pressure medication changes were made.    He does see [facility].  Had catheter in for about 2 weeks.      He remains on Tamsulosin.  No ill Se  Denies urinary concerns at present.              Objective    /70 (BP Location: Right arm, Patient Position: Chair, Cuff Size: Adult Large)   Pulse 100   Temp 98.7  F (37.1  C) (Temporal)   Resp 16   Ht 1.778 m (5' 10\")   Wt 96.2 kg (212 lb)   SpO2 97%   BMI 30.42 kg/m    Body mass index is 30.42 kg/m .  Physical Exam   General:  Awake, alert and NAD  HEENT:  NCAT, MMM  RESP:  No accessory muscle use, no audible wheezing.   ABD:  no pulsatile mass, non protuberant  EXT: non obvious C/C/E  PSYCH: Pleasant, conversant, makes eye contact              Signed Electronically by: Darrell Campoverde MD    "

## 2024-03-27 ENCOUNTER — ALLIED HEALTH/NURSE VISIT (OUTPATIENT)
Dept: FAMILY MEDICINE | Facility: CLINIC | Age: 84
End: 2024-03-27
Payer: COMMERCIAL

## 2024-03-27 VITALS — SYSTOLIC BLOOD PRESSURE: 140 MMHG | DIASTOLIC BLOOD PRESSURE: 93 MMHG

## 2024-03-27 DIAGNOSIS — I10 PRIMARY HYPERTENSION: Primary | ICD-10-CM

## 2024-03-27 PROCEDURE — 99207 PR NO CHARGE NURSE ONLY: CPT | Performed by: INTERNAL MEDICINE

## 2024-03-27 NOTE — PROGRESS NOTES
Bimal Lemos was evaluated at [facility] on March 27, 2024 at which time his blood pressure was:    BP Readings from Last 3 Encounters:   03/27/24 (!) 140/93   03/11/24 118/70   01/28/24 (!) 160/89     Pulse Readings from Last 3 Encounters:   03/11/24 100   01/28/24 90   09/11/23 84       Reviewed lifestyle modifications for blood pressure control and reduction: including making healthy food choices, managing weight, getting regular exercise, smoking cessation, reducing alcohol consumption, monitoring blood pressure regularly.     Symptoms: None    BP Goal:< 140/90 mmHg    BP Assessment:  BP too high    Potential Reasons for BP too high: Physical activity within past 30 minutes    BP Follow-Up Plan: Recheck BP in 30 days at pharmacy    Recommendation to Provider: bp check in 1 month     Note completed by: Joslyn Benavidez, PharmD, [facility]  Pharmacy Specialist  [facility]  [phone]

## 2024-05-05 ENCOUNTER — ALLIED HEALTH/NURSE VISIT (OUTPATIENT)
Dept: FAMILY MEDICINE | Facility: CLINIC | Age: 84
End: 2024-05-05
Payer: COMMERCIAL

## 2024-05-05 VITALS — HEART RATE: 78 BPM | SYSTOLIC BLOOD PRESSURE: 139 MMHG | DIASTOLIC BLOOD PRESSURE: 85 MMHG

## 2024-05-05 DIAGNOSIS — Z01.30 BP CHECK: Primary | ICD-10-CM

## 2024-05-05 PROCEDURE — 99207 PR NO CHARGE NURSE ONLY: CPT | Performed by: INTERNAL MEDICINE

## 2024-05-05 NOTE — PROGRESS NOTES
Bimal Lemos was evaluated at [facility] on May 5, 2024 at which time his blood pressure was:    BP Readings from Last 1 Encounters:   05/05/24 139/85     No data recorded      Reviewed lifestyle modifications for blood pressure control and reduction: including making healthy food choices, managing weight, getting regular exercise, smoking cessation, reducing alcohol consumption, monitoring blood pressure regularly.     Symptoms: None    BP Goal:< 140/90 mmHg    BP Assessment:  BP at goal    Potential Reasons for BP too high: NA - Not applicable    BP Follow-Up Plan: Recheck BP in 6 months at pharmacy    Recommendation to Provider: Patient's BP was elevated on first check. He rested in the office for 10 minutes and was at goal when we rechecked. He was pleased and said he was happy to see it was going the right direction.    Note completed by: Maykel Quiroga RPH

## 2024-09-13 ENCOUNTER — OFFICE VISIT (OUTPATIENT)
Dept: FAMILY MEDICINE | Facility: CLINIC | Age: 84
End: 2024-09-13
Payer: COMMERCIAL

## 2024-09-13 VITALS
DIASTOLIC BLOOD PRESSURE: 88 MMHG | OXYGEN SATURATION: 97 % | TEMPERATURE: 98.1 F | WEIGHT: 219.9 LBS | HEIGHT: 68 IN | SYSTOLIC BLOOD PRESSURE: 135 MMHG | BODY MASS INDEX: 33.33 KG/M2 | HEART RATE: 86 BPM | RESPIRATION RATE: 16 BRPM

## 2024-09-13 DIAGNOSIS — Z00.00 ENCOUNTER FOR MEDICARE ANNUAL WELLNESS EXAM: Primary | ICD-10-CM

## 2024-09-13 DIAGNOSIS — I10 ESSENTIAL HYPERTENSION: ICD-10-CM

## 2024-09-13 DIAGNOSIS — R33.9 URINARY RETENTION: ICD-10-CM

## 2024-09-13 DIAGNOSIS — N18.31 CHRONIC KIDNEY DISEASE, STAGE 3A (H): ICD-10-CM

## 2024-09-13 DIAGNOSIS — Z13.220 LIPID SCREENING: ICD-10-CM

## 2024-09-13 LAB
ALBUMIN SERPL BCG-MCNC: 3.9 G/DL (ref 3.5–5.2)
ALP SERPL-CCNC: 80 U/L (ref 40–150)
ALT SERPL W P-5'-P-CCNC: 9 U/L (ref 0–70)
ANION GAP SERPL CALCULATED.3IONS-SCNC: 10 MMOL/L (ref 7–15)
AST SERPL W P-5'-P-CCNC: 19 U/L (ref 0–45)
BASOPHILS # BLD AUTO: 0 10E3/UL (ref 0–0.2)
BASOPHILS NFR BLD AUTO: 0 %
BILIRUB SERPL-MCNC: 1.4 MG/DL
BUN SERPL-MCNC: 27.7 MG/DL (ref 8–23)
CALCIUM SERPL-MCNC: 9.2 MG/DL (ref 8.8–10.4)
CHLORIDE SERPL-SCNC: 104 MMOL/L (ref 98–107)
CHOLEST SERPL-MCNC: 156 MG/DL
CREAT SERPL-MCNC: 1.36 MG/DL (ref 0.67–1.17)
EGFRCR SERPLBLD CKD-EPI 2021: 52 ML/MIN/1.73M2
EOSINOPHIL # BLD AUTO: 0 10E3/UL (ref 0–0.7)
EOSINOPHIL NFR BLD AUTO: 0 %
ERYTHROCYTE [DISTWIDTH] IN BLOOD BY AUTOMATED COUNT: 13.4 % (ref 10–15)
FASTING STATUS PATIENT QL REPORTED: NO
FASTING STATUS PATIENT QL REPORTED: NO
GLUCOSE SERPL-MCNC: 101 MG/DL (ref 70–99)
HCO3 SERPL-SCNC: 24 MMOL/L (ref 22–29)
HCT VFR BLD AUTO: 49.6 % (ref 40–53)
HDLC SERPL-MCNC: 47 MG/DL
HGB BLD-MCNC: 16.1 G/DL (ref 13.3–17.7)
IMM GRANULOCYTES # BLD: 0 10E3/UL
IMM GRANULOCYTES NFR BLD: 0 %
LDLC SERPL CALC-MCNC: 94 MG/DL
LYMPHOCYTES # BLD AUTO: 1.1 10E3/UL (ref 0.8–5.3)
LYMPHOCYTES NFR BLD AUTO: 15 %
MCH RBC QN AUTO: 28.2 PG (ref 26.5–33)
MCHC RBC AUTO-ENTMCNC: 32.5 G/DL (ref 31.5–36.5)
MCV RBC AUTO: 87 FL (ref 78–100)
MONOCYTES # BLD AUTO: 0.6 10E3/UL (ref 0–1.3)
MONOCYTES NFR BLD AUTO: 9 %
NEUTROPHILS # BLD AUTO: 5.4 10E3/UL (ref 1.6–8.3)
NEUTROPHILS NFR BLD AUTO: 76 %
NONHDLC SERPL-MCNC: 109 MG/DL
PLATELET # BLD AUTO: 199 10E3/UL (ref 150–450)
POTASSIUM SERPL-SCNC: 4.1 MMOL/L (ref 3.4–5.3)
PROT SERPL-MCNC: 6.8 G/DL (ref 6.4–8.3)
RBC # BLD AUTO: 5.71 10E6/UL (ref 4.4–5.9)
SODIUM SERPL-SCNC: 138 MMOL/L (ref 135–145)
TRIGL SERPL-MCNC: 74 MG/DL
TSH SERPL DL<=0.005 MIU/L-ACNC: 1.76 UIU/ML (ref 0.3–4.2)
WBC # BLD AUTO: 7.2 10E3/UL (ref 4–11)

## 2024-09-13 PROCEDURE — 99214 OFFICE O/P EST MOD 30 MIN: CPT | Mod: 25 | Performed by: INTERNAL MEDICINE

## 2024-09-13 PROCEDURE — 80061 LIPID PANEL: CPT | Performed by: INTERNAL MEDICINE

## 2024-09-13 PROCEDURE — G0008 ADMIN INFLUENZA VIRUS VAC: HCPCS | Performed by: INTERNAL MEDICINE

## 2024-09-13 PROCEDURE — 36415 COLL VENOUS BLD VENIPUNCTURE: CPT | Performed by: INTERNAL MEDICINE

## 2024-09-13 PROCEDURE — G0439 PPPS, SUBSEQ VISIT: HCPCS | Performed by: INTERNAL MEDICINE

## 2024-09-13 PROCEDURE — 80053 COMPREHEN METABOLIC PANEL: CPT | Performed by: INTERNAL MEDICINE

## 2024-09-13 PROCEDURE — 90662 IIV NO PRSV INCREASED AG IM: CPT | Performed by: INTERNAL MEDICINE

## 2024-09-13 PROCEDURE — 85025 COMPLETE CBC W/AUTO DIFF WBC: CPT | Mod: GZ | Performed by: INTERNAL MEDICINE

## 2024-09-13 PROCEDURE — 84443 ASSAY THYROID STIM HORMONE: CPT | Mod: GZ | Performed by: INTERNAL MEDICINE

## 2024-09-13 ASSESSMENT — PAIN SCALES - GENERAL: PAINLEVEL: NO PAIN (0)

## 2024-09-13 NOTE — PROGRESS NOTES
"Preventive Care Visit  [facility]  Darrell Campoverde MD, Internal Medicine  Sep 13, 2024      Assessment & Plan     Encounter for Medicare annual wellness exam  Age and gender appropriate preventive care and screenings are discussed.  Particular attention to personal preventive care and age appropriate lifestyle including the incorporation of healthy diet and physical activity is made.      - CBC with Platelets & Differential  - Comprehensive metabolic panel  - TSH with free T4 reflex  - Lipid panel reflex to direct LDL Fasting    Essential hypertension  Stable on current regimen. Labs as ordered. Med management.    - CBC with Platelets & Differential  - Comprehensive metabolic panel  - TSH with free T4 reflex  - Lipid panel reflex to direct LDL Fasting    Chronic kidney disease, stage 3a (H)  Avoid nephrotoxins.  Labs.    - CBC with Platelets & Differential  - Comprehensive metabolic panel  - TSH with free T4 reflex  - Lipid panel reflex to direct LDL Fasting    Lipid screening    - CBC with Platelets & Differential  - Comprehensive metabolic panel  - TSH with free T4 reflex  - Lipid panel reflex to direct LDL Fasting    Urinary retention  Stable on Flomax.  No concerns today.            BMI  Estimated body mass index is 33.44 kg/m  as calculated from the following:    Height as of this encounter: 1.727 m (5' 8\").    Weight as of this encounter: 99.7 kg (219 lb 14.4 oz).       Counseling  Appropriate preventive services were addressed with this patient via screening, questionnaire, or discussion as appropriate for fall prevention, nutrition, physical activity, Tobacco-use cessation, social engagement, weight loss and cognition.  Checklist reviewing preventive services available has been given to the patient.  Reviewed patient's diet, addressing concerns and/or questions.   He is at risk for lack of exercise and has been provided with information to increase physical activity for the benefit of " his well-being.           Subjective   Nic is a 84 year old, presenting for the following:  Physical          Health Care Directive  Patient does not have a Health Care Directive or Living Will: Discussed advance care planning with patient; however, patient declined at this time.    HPI      HTN  The patient has a history of hypertension.  Blood pressure is noted as below.  Compliance with medication is noted.  Side effects of medication are not described.  Symptoms of uncontrolled hypertension including chest pain, dizziness, and vision changes have not been observed.      On flomax for retention.  No issues. Sees [facility].              9/13/2024   General Health   How would you rate your overall physical health? Excellent   Feel stress (tense, anxious, or unable to sleep) Not at all            9/13/2024   Nutrition   Diet: Regular (no restrictions)            9/13/2024   Exercise   Days per week of moderate/strenous exercise 2 days      (!) EXERCISE CONCERN      9/13/2024   Social Factors   Frequency of gathering with friends or relatives Three times a week   Worry food won't last until get money to buy more No   Food not last or not have enough money for food? No   Do you have housing? (Housing is defined as stable permanent housing and does not include staying ouside in a car, in a tent, in an abandoned building, in an overnight shelter, or couch-surfing.) Yes   Are you worried about losing your housing? No   Lack of transportation? No   Unable to get utilities (heat,electricity)? No            9/13/2024   Fall Risk   Fallen 2 or more times in the past year? No    No   Trouble with walking or balance? No    No       Multiple values from one day are sorted in reverse-chronological order          9/13/2024   Activities of Daily Living- Home Safety   Needs help with the following daily activites None of the above   Safety concerns in the home None of the above            9/13/2024   Dental   Dentist two times  every year? Yes            9/13/2024   Hearing Screening   Hearing concerns? None of the above            9/13/2024   Driving Risk Screening   Patient/family members have concerns about driving No            9/13/2024   General Alertness/Fatigue Screening   Have you been more tired than usual lately? No            9/13/2024   Urinary Incontinence Screening   Bothered by leaking urine in past 6 months No            9/13/2024   TB Screening   Were you born outside of the [place]? No            Today's PHQ-2 Score:       9/13/2024    11:06 AM   PHQ-2 ( 1999 [employer])   Q1: Little interest or pleasure in doing things 0   Q2: Feeling down, depressed or hopeless 0   PHQ-2 Score 0   Q1: Little interest or pleasure in doing things Not at all   Q2: Feeling down, depressed or hopeless Not at all   PHQ-2 Score 0           9/13/2024   Substance Use   Alcohol more than 3/day or more than 7/wk Not Applicable   Do you have a current opioid prescription? No   How severe/bad is pain from 1 to 10? 0/10 (No Pain)   Do you use any other substances recreationally? No    (!) DECLINE       Multiple values from one day are sorted in reverse-chronological order     Social History     Tobacco Use    Smoking status: Never    Smokeless tobacco: Never   Vaping Use    Vaping status: Never Used   Substance Use Topics    Alcohol use: Yes     Alcohol/week: 0.0 standard drinks of alcohol     Comment: rare    Drug use: No                 Reviewed and updated as needed this visit by Provider                      Current providers sharing in care for this patient include:  Patient Care Team:  Darrell Campoverde MD as PCP - General (Internal Medicine)  Darrell Campoverde MD as Assigned PCP    The following health maintenance items are reviewed in Epic and correct as of today:  Health Maintenance   Topic Date Due    ANNUAL REVIEW OF HM ORDERS  Never done    ZOSTER IMMUNIZATION (1 of 2) Never done    RSV VACCINE (1 - 1-dose 75+ series) Never done    DTAP/TDAP/TD  "IMMUNIZATION (2 - Td or Tdap) 10/10/2021    INFLUENZA VACCINE (1) 09/01/2024    COVID-19 Vaccine (6 - 2023-24 season) 09/01/2024    LIPID  09/11/2024    MEDICARE ANNUAL WELLNESS VISIT  09/11/2024    HEMOGLOBIN  01/28/2025    BMP  03/11/2025    MICROALBUMIN  03/11/2025    FALL RISK ASSESSMENT  09/13/2025    ADVANCE CARE PLANNING  09/13/2029    PHQ-2 (once per calendar year)  Completed    Pneumococcal Vaccine: 65+ Years  Completed    URINALYSIS  Completed    HPV IMMUNIZATION  Aged Out    MENINGITIS IMMUNIZATION  Aged Out    RSV MONOCLONAL ANTIBODY  Aged Out            Objective    Exam  /88 (BP Location: Left arm, Patient Position: Sitting, Cuff Size: Adult Large)   Pulse 86   Temp 98.1  F (36.7  C) (Temporal)   Resp 16   Ht 1.727 m (5' 8\")   Wt 99.7 kg (219 lb 14.4 oz)   SpO2 97%   BMI 33.44 kg/m     Estimated body mass index is 33.44 kg/m  as calculated from the following:    Height as of this encounter: 1.727 m (5' 8\").    Weight as of this encounter: 99.7 kg (219 lb 14.4 oz).    Physical Exam  GENERAL: alert and no distress  NECK: no adenopathy, no asymmetry, masses, or scars  RESP: lungs clear to auscultation - no rales, rhonchi or wheezes  CV: regular rate and rhythm, normal S1 S2, no S3 or S4, no murmur, click or rub, no peripheral edema  ABDOMEN: soft, nontender, no hepatosplenomegaly, no masses and bowel sounds normal  MS: no gross musculoskeletal defects noted, no edema         9/13/2024   Mini Cog   Clock Draw Score 2 Normal   3 Item Recall 0 objects recalled   Mini Cog Total Score 2                 Signed Electronically by: Darrell Campoverde MD    "

## 2024-09-13 NOTE — PATIENT INSTRUCTIONS
LABS please    VACCINES I RECOMMEND:     RSV (one time vaccine at your drug store, not covered at doctor's office)     COVID (annually at least) at the doctor's office or drug store      FLU (annually) at the doctor's office or drug store      SHINGLES vaccine (at the drugstore) this is a 2 shot series (not covered at doctor's office)

## 2024-09-24 DIAGNOSIS — I10 ESSENTIAL HYPERTENSION: ICD-10-CM

## 2024-09-24 RX ORDER — LISINOPRIL 30 MG/1
30 TABLET ORAL DAILY
Qty: 90 TABLET | Refills: 3 | Status: SHIPPED | OUTPATIENT
Start: 2024-09-24

## 2024-10-02 ENCOUNTER — ALLIED HEALTH/NURSE VISIT (OUTPATIENT)
Dept: FAMILY MEDICINE | Facility: CLINIC | Age: 84
End: 2024-10-02
Payer: COMMERCIAL

## 2024-10-02 DIAGNOSIS — Z23 NEED FOR PROPHYLACTIC VACCINATION AND INOCULATION AGAINST INFLUENZA: Primary | ICD-10-CM

## 2024-10-02 PROCEDURE — G0008 ADMIN INFLUENZA VIRUS VAC: HCPCS

## 2024-10-02 PROCEDURE — 90662 IIV NO PRSV INCREASED AG IM: CPT

## 2024-10-02 NOTE — PROGRESS NOTES
Patient presents to influenza program requesting influenza vaccination.  Standing orders implemented. See telephone encounter dated 09/30/24 for further explanation.    Vaccination given by Marvin Bah CMA on 10/2/2024 at 11:39 AM    Recorded by Marvin Bah CMA on 10/2/2024 at 11:39 AM

## 2025-03-23 ENCOUNTER — HEALTH MAINTENANCE LETTER (OUTPATIENT)
Age: 85
End: 2025-03-23

## (undated) RX ORDER — FENTANYL CITRATE 50 UG/ML
INJECTION, SOLUTION INTRAMUSCULAR; INTRAVENOUS
Status: DISPENSED
Start: 2018-03-01